# Patient Record
Sex: FEMALE | Race: BLACK OR AFRICAN AMERICAN | NOT HISPANIC OR LATINO | Employment: OTHER | URBAN - METROPOLITAN AREA
[De-identification: names, ages, dates, MRNs, and addresses within clinical notes are randomized per-mention and may not be internally consistent; named-entity substitution may affect disease eponyms.]

---

## 2023-06-07 DIAGNOSIS — B02.23 SHINGLES (HERPES ZOSTER) POLYNEUROPATHY: ICD-10-CM

## 2023-06-07 RX ORDER — HYDROCODONE BITARTRATE AND ACETAMINOPHEN 5; 325 MG/1; MG/1
TABLET ORAL
COMMUNITY
Start: 2023-03-13 | End: 2023-07-21 | Stop reason: ALTCHOICE

## 2023-06-07 RX ORDER — ALBUTEROL SULFATE 90 UG/1
AEROSOL, METERED RESPIRATORY (INHALATION)
COMMUNITY
Start: 2023-01-06

## 2023-06-07 RX ORDER — ALBUTEROL SULFATE 1.25 MG/3ML
SOLUTION RESPIRATORY (INHALATION) 4 TIMES DAILY
COMMUNITY
Start: 2023-01-13 | End: 2024-01-29 | Stop reason: WASHOUT

## 2023-06-07 RX ORDER — LEVOTHYROXINE SODIUM 75 UG/1
1 TABLET ORAL DAILY
COMMUNITY
Start: 2016-02-22 | End: 2023-07-06 | Stop reason: SDUPTHER

## 2023-06-07 RX ORDER — PREDNISONE 10 MG/1
TABLET ORAL
COMMUNITY
Start: 2023-01-06 | End: 2023-07-21 | Stop reason: ALTCHOICE

## 2023-06-07 RX ORDER — VALACYCLOVIR HYDROCHLORIDE 500 MG/1
1 TABLET, FILM COATED ORAL DAILY
COMMUNITY
Start: 2015-09-14 | End: 2023-06-07 | Stop reason: SDUPTHER

## 2023-06-07 RX ORDER — AMITRIPTYLINE HYDROCHLORIDE 10 MG/1
TABLET, FILM COATED ORAL
COMMUNITY
Start: 2023-03-01 | End: 2023-07-21 | Stop reason: ALTCHOICE

## 2023-06-07 RX ORDER — AZELASTINE 1 MG/ML
SPRAY, METERED NASAL
COMMUNITY
Start: 2021-06-17

## 2023-06-07 RX ORDER — FLUTICASONE PROPIONATE AND SALMETEROL 100; 50 UG/1; UG/1
1 POWDER RESPIRATORY (INHALATION) 2 TIMES DAILY
COMMUNITY
Start: 2023-01-13 | End: 2024-01-29 | Stop reason: WASHOUT

## 2023-06-07 RX ORDER — MOMETASONE FUROATE 50 UG/1
2 SPRAY, METERED NASAL DAILY
COMMUNITY
End: 2024-01-29 | Stop reason: WASHOUT

## 2023-06-07 RX ORDER — AZITHROMYCIN 250 MG/1
TABLET, FILM COATED ORAL
COMMUNITY
Start: 2023-01-13 | End: 2023-07-21 | Stop reason: ALTCHOICE

## 2023-06-07 RX ORDER — AMITRIPTYLINE HYDROCHLORIDE 25 MG/1
TABLET, FILM COATED ORAL
COMMUNITY
Start: 2023-05-17 | End: 2024-01-29 | Stop reason: WASHOUT

## 2023-06-07 RX ORDER — DOXYCYCLINE 100 MG/1
CAPSULE ORAL
COMMUNITY
Start: 2023-01-06 | End: 2023-07-21 | Stop reason: ALTCHOICE

## 2023-06-15 RX ORDER — VALACYCLOVIR HYDROCHLORIDE 500 MG/1
500 TABLET, FILM COATED ORAL DAILY
Qty: 90 TABLET | Refills: 3 | Status: SHIPPED | OUTPATIENT
Start: 2023-06-15 | End: 2023-07-21 | Stop reason: SDUPTHER

## 2023-06-22 PROBLEM — N95.2 ATROPHY OF VAGINA: Status: ACTIVE | Noted: 2023-06-22

## 2023-06-22 PROBLEM — J04.0 LARYNGITIS: Status: ACTIVE | Noted: 2023-06-22

## 2023-06-22 PROBLEM — M17.9 KNEE OSTEOARTHRITIS: Status: ACTIVE | Noted: 2023-06-22

## 2023-06-22 PROBLEM — M62.89 PELVIC FLOOR DYSFUNCTION: Status: ACTIVE | Noted: 2023-06-22

## 2023-06-22 PROBLEM — H52.4 ASTIGMATISM OF BOTH EYES WITH PRESBYOPIA: Status: ACTIVE | Noted: 2023-06-22

## 2023-06-22 PROBLEM — R53.1 WEAKNESS: Status: ACTIVE | Noted: 2023-06-22

## 2023-06-22 PROBLEM — M79.18 MYOFASCIAL PAIN SYNDROME: Status: ACTIVE | Noted: 2023-06-22

## 2023-06-22 PROBLEM — R79.89 LFT ELEVATION: Status: ACTIVE | Noted: 2023-06-22

## 2023-06-22 PROBLEM — I95.9 LOW BLOOD PRESSURE: Status: ACTIVE | Noted: 2023-06-22

## 2023-06-22 PROBLEM — M25.512 SHOULDER PAIN, BILATERAL: Status: ACTIVE | Noted: 2023-06-22

## 2023-06-22 PROBLEM — M99.05 SEGMENTAL AND SOMATIC DYSFUNCTION OF PELVIC REGION: Status: ACTIVE | Noted: 2023-06-22

## 2023-06-22 PROBLEM — R20.2 PARESTHESIA OF BOTH HANDS: Status: ACTIVE | Noted: 2023-06-22

## 2023-06-22 PROBLEM — G47.00 INSOMNIA: Status: ACTIVE | Noted: 2023-06-22

## 2023-06-22 PROBLEM — N95.1 HOT FLASHES, MENOPAUSAL: Status: ACTIVE | Noted: 2023-06-22

## 2023-06-22 PROBLEM — J30.89 ALLERGIC RHINITIS DUE TO DUST MITE: Status: ACTIVE | Noted: 2023-06-22

## 2023-06-22 PROBLEM — R73.9 HYPERGLYCEMIA: Status: ACTIVE | Noted: 2023-06-22

## 2023-06-22 PROBLEM — E83.51 HYPOCALCEMIA: Status: ACTIVE | Noted: 2023-06-22

## 2023-06-22 PROBLEM — K52.9 CHRONIC DIARRHEA: Status: ACTIVE | Noted: 2023-06-22

## 2023-06-22 PROBLEM — H16.229 KERATOCONJUNCTIVITIS SICCA: Status: ACTIVE | Noted: 2023-06-22

## 2023-06-22 PROBLEM — E53.8 VITAMIN B12 DEFICIENCY: Status: ACTIVE | Noted: 2023-06-22

## 2023-06-22 PROBLEM — M25.511 SHOULDER PAIN, BILATERAL: Status: ACTIVE | Noted: 2023-06-22

## 2023-06-22 PROBLEM — K58.9 IRRITABLE BOWEL SYNDROME: Status: ACTIVE | Noted: 2023-06-22

## 2023-06-22 PROBLEM — M25.551 ARTHRALGIA OF RIGHT HIP: Status: ACTIVE | Noted: 2023-06-22

## 2023-06-22 PROBLEM — R32 URINARY INCONTINENCE: Status: ACTIVE | Noted: 2023-06-22

## 2023-06-22 PROBLEM — M19.049 HAND ARTHRITIS: Status: ACTIVE | Noted: 2023-06-22

## 2023-06-22 PROBLEM — H52.203 ASTIGMATISM OF BOTH EYES WITH PRESBYOPIA: Status: ACTIVE | Noted: 2023-06-22

## 2023-06-22 PROBLEM — R51.9 HEADACHE: Status: ACTIVE | Noted: 2023-06-22

## 2023-06-22 PROBLEM — J06.9 URI WITH COUGH AND CONGESTION: Status: ACTIVE | Noted: 2023-06-22

## 2023-06-22 PROBLEM — H04.123 DRY EYE SYNDROME OF BOTH LACRIMAL GLANDS: Status: ACTIVE | Noted: 2023-06-22

## 2023-06-22 PROBLEM — J30.9 ALLERGIC RHINITIS: Status: ACTIVE | Noted: 2023-06-22

## 2023-06-22 PROBLEM — G47.33 OBSTRUCTIVE SLEEP APNEA: Status: ACTIVE | Noted: 2023-06-22

## 2023-06-22 PROBLEM — G47.21 DELAYED SLEEP PHASE SYNDROME: Status: ACTIVE | Noted: 2023-06-22

## 2023-06-22 PROBLEM — M75.80 ROTATOR CUFF TENDINITIS: Status: ACTIVE | Noted: 2023-06-22

## 2023-06-22 PROBLEM — J38.1 VOCAL CORD POLYP: Status: ACTIVE | Noted: 2023-06-22

## 2023-06-22 PROBLEM — R60.9 EDEMA: Status: ACTIVE | Noted: 2023-06-22

## 2023-06-22 PROBLEM — R21 RASH AND NONSPECIFIC SKIN ERUPTION: Status: ACTIVE | Noted: 2023-06-22

## 2023-06-22 PROBLEM — K21.9 ESOPHAGEAL REFLUX: Status: ACTIVE | Noted: 2023-06-22

## 2023-06-22 PROBLEM — D51.0 PERNICIOUS ANEMIA: Status: ACTIVE | Noted: 2023-06-22

## 2023-06-22 PROBLEM — R11.0 NAUSEA IN ADULT: Status: ACTIVE | Noted: 2023-06-22

## 2023-06-22 PROBLEM — F33.1 MODERATE EPISODE OF RECURRENT MAJOR DEPRESSIVE DISORDER (MULTI): Status: ACTIVE | Noted: 2023-06-22

## 2023-06-22 PROBLEM — M54.6 PAIN IN THORACIC SPINE: Status: ACTIVE | Noted: 2023-06-22

## 2023-06-22 PROBLEM — N62 MACROMASTIA: Status: ACTIVE | Noted: 2023-06-22

## 2023-06-22 PROBLEM — R10.2 PELVIC PAIN IN FEMALE: Status: ACTIVE | Noted: 2023-06-22

## 2023-06-22 PROBLEM — M53.3 SACROILIAC JOINT DYSFUNCTION OF BOTH SIDES: Status: ACTIVE | Noted: 2023-06-22

## 2023-06-22 PROBLEM — B30.9 ACUTE VIRAL CONJUNCTIVITIS OF BOTH EYES: Status: ACTIVE | Noted: 2023-06-22

## 2023-06-22 PROBLEM — J22 LOWER RESPIRATORY INFECTION: Status: ACTIVE | Noted: 2023-06-22

## 2023-06-22 PROBLEM — R49.0 HOARSENESS: Status: ACTIVE | Noted: 2023-06-22

## 2023-06-22 PROBLEM — Z98.84 H/O BARIATRIC SURGERY: Status: ACTIVE | Noted: 2023-06-22

## 2023-06-22 PROBLEM — M25.561 RIGHT KNEE PAIN: Status: ACTIVE | Noted: 2023-06-22

## 2023-06-22 PROBLEM — D64.9 ANEMIA: Status: ACTIVE | Noted: 2023-06-22

## 2023-06-22 PROBLEM — R53.83 FATIGUE: Status: ACTIVE | Noted: 2023-06-22

## 2023-06-22 PROBLEM — M54.9 BACK PAIN: Status: ACTIVE | Noted: 2023-06-22

## 2023-06-22 PROBLEM — E03.9 HYPOTHYROIDISM: Status: ACTIVE | Noted: 2023-06-22

## 2023-06-22 PROBLEM — J45.909 ASTHMATIC BRONCHITIS (HHS-HCC): Status: ACTIVE | Noted: 2023-06-22

## 2023-06-22 PROBLEM — R63.0 POOR APPETITE: Status: ACTIVE | Noted: 2023-06-22

## 2023-06-22 PROBLEM — B00.9 HERPES SIMPLEX VIRUS (HSV) INFECTION: Status: ACTIVE | Noted: 2023-06-22

## 2023-06-22 PROBLEM — J30.81 ALLERGIC RHINITIS DUE TO ANIMAL HAIR AND DANDER: Status: ACTIVE | Noted: 2023-06-22

## 2023-06-22 PROBLEM — E55.9 VITAMIN D DEFICIENCY: Status: ACTIVE | Noted: 2023-06-22

## 2023-06-22 PROBLEM — R19.7 DIARRHEA: Status: ACTIVE | Noted: 2023-06-22

## 2023-06-22 PROBLEM — M54.16 LUMBAR RADICULITIS: Status: ACTIVE | Noted: 2023-06-22

## 2023-06-22 PROBLEM — M25.859 FEMOROACETABULAR IMPINGEMENT: Status: ACTIVE | Noted: 2023-06-22

## 2023-06-22 PROBLEM — F41.9 ANXIETY: Status: ACTIVE | Noted: 2023-06-22

## 2023-06-22 PROBLEM — R06.02 SHORTNESS OF BREATH AT REST: Status: ACTIVE | Noted: 2023-06-22

## 2023-06-22 PROBLEM — M35.01 KERATOCONJUNCTIVITIS SICCA (MULTI): Status: ACTIVE | Noted: 2023-06-22

## 2023-06-22 PROBLEM — F43.10 PTSD (POST-TRAUMATIC STRESS DISORDER): Status: ACTIVE | Noted: 2023-06-22

## 2023-06-22 PROBLEM — D21.9 FIBROIDS: Status: ACTIVE | Noted: 2023-06-22

## 2023-06-22 PROBLEM — M75.20 BICEPS TENDONITIS: Status: ACTIVE | Noted: 2023-06-22

## 2023-06-22 PROBLEM — R05.3 CHRONIC COUGH: Status: ACTIVE | Noted: 2023-06-22

## 2023-07-06 DIAGNOSIS — E03.9 HYPOTHYROIDISM, UNSPECIFIED TYPE: Primary | ICD-10-CM

## 2023-07-10 RX ORDER — LEVOTHYROXINE SODIUM 75 UG/1
75 TABLET ORAL DAILY
Qty: 90 TABLET | Refills: 0 | Status: SHIPPED | OUTPATIENT
Start: 2023-07-10 | End: 2023-07-27

## 2023-07-13 ENCOUNTER — TELEPHONE (OUTPATIENT)
Dept: PRIMARY CARE | Facility: CLINIC | Age: 58
End: 2023-07-13
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
## 2023-07-18 NOTE — TELEPHONE ENCOUNTER
I called pt yesterday at around 6pm and again today at around 7:10am  No answer, I left message both times to call the office back and to follow up with psych

## 2023-07-21 ENCOUNTER — OFFICE VISIT (OUTPATIENT)
Dept: PRIMARY CARE | Facility: CLINIC | Age: 58
End: 2023-07-21
Payer: MEDICARE

## 2023-07-21 VITALS
SYSTOLIC BLOOD PRESSURE: 98 MMHG | BODY MASS INDEX: 36.82 KG/M2 | TEMPERATURE: 98 F | DIASTOLIC BLOOD PRESSURE: 68 MMHG | OXYGEN SATURATION: 98 % | RESPIRATION RATE: 15 BRPM | HEART RATE: 68 BPM | WEIGHT: 221 LBS | HEIGHT: 65 IN

## 2023-07-21 DIAGNOSIS — Z13.9 ENCOUNTER FOR SCREENING INVOLVING SOCIAL DETERMINANTS OF HEALTH (SDOH): ICD-10-CM

## 2023-07-21 DIAGNOSIS — K58.0 IRRITABLE BOWEL SYNDROME WITH DIARRHEA: ICD-10-CM

## 2023-07-21 DIAGNOSIS — R11.0 NAUSEA: ICD-10-CM

## 2023-07-21 DIAGNOSIS — F33.1 MODERATE EPISODE OF RECURRENT MAJOR DEPRESSIVE DISORDER (MULTI): ICD-10-CM

## 2023-07-21 DIAGNOSIS — B02.23 SHINGLES (HERPES ZOSTER) POLYNEUROPATHY: ICD-10-CM

## 2023-07-21 DIAGNOSIS — M35.01 KERATOCONJUNCTIVITIS SICCA (MULTI): ICD-10-CM

## 2023-07-21 DIAGNOSIS — J30.89 ALLERGIC RHINITIS DUE TO DUST MITE: Primary | ICD-10-CM

## 2023-07-21 DIAGNOSIS — R60.9 SWELLING: ICD-10-CM

## 2023-07-21 DIAGNOSIS — E66.01 SEVERE OBESITY (MULTI): ICD-10-CM

## 2023-07-21 PROBLEM — D50.8 IRON DEFICIENCY ANEMIA SECONDARY TO INADEQUATE DIETARY IRON INTAKE: Status: ACTIVE | Noted: 2017-02-27

## 2023-07-21 PROBLEM — M75.100 ROTATOR CUFF SYNDROME: Status: ACTIVE | Noted: 2018-01-23

## 2023-07-21 PROBLEM — G89.29 CHRONIC BILATERAL LOW BACK PAIN: Status: ACTIVE | Noted: 2017-06-21

## 2023-07-21 PROBLEM — F32.A DEPRESSIVE DISORDER: Status: ACTIVE | Noted: 2018-01-23

## 2023-07-21 PROBLEM — E63.9 NUTRITIONAL DEFICIENCY: Status: ACTIVE | Noted: 2017-02-27

## 2023-07-21 PROBLEM — E66.812 OBESITY, CLASS II, BMI 35-39.9: Status: ACTIVE | Noted: 2018-09-12

## 2023-07-21 PROBLEM — R44.0 AUDITORY HALLUCINATIONS: Status: ACTIVE | Noted: 2023-07-21

## 2023-07-21 PROBLEM — M54.50 CHRONIC BILATERAL LOW BACK PAIN: Status: ACTIVE | Noted: 2017-06-21

## 2023-07-21 PROBLEM — H52.4 BILATERAL PRESBYOPIA: Status: ACTIVE | Noted: 2023-07-21

## 2023-07-21 PROBLEM — E66.9 OBESITY, CLASS II, BMI 35-39.9: Status: ACTIVE | Noted: 2018-09-12

## 2023-07-21 PROBLEM — E87.6 HYPOKALEMIA: Status: ACTIVE | Noted: 2023-07-21

## 2023-07-21 PROBLEM — N62 HYPERTROPHY OF BREAST: Status: ACTIVE | Noted: 2018-06-01

## 2023-07-21 PROBLEM — R07.89 ATYPICAL CHEST PAIN: Status: ACTIVE | Noted: 2017-02-27

## 2023-07-21 PROCEDURE — 99215 OFFICE O/P EST HI 40 MIN: CPT | Performed by: FAMILY MEDICINE

## 2023-07-21 PROCEDURE — 1036F TOBACCO NON-USER: CPT | Performed by: FAMILY MEDICINE

## 2023-07-21 RX ORDER — DIPHENOXYLATE HYDROCHLORIDE AND ATROPINE SULFATE 2.5; .025 MG/1; MG/1
1 TABLET ORAL 2 TIMES DAILY PRN
Qty: 30 TABLET | Refills: 0 | Status: SHIPPED | OUTPATIENT
Start: 2023-07-21

## 2023-07-21 RX ORDER — FUROSEMIDE 20 MG/1
20 TABLET ORAL DAILY
Qty: 90 TABLET | Refills: 0 | Status: SHIPPED | OUTPATIENT
Start: 2023-07-21

## 2023-07-21 RX ORDER — SERTRALINE HYDROCHLORIDE 50 MG/1
50 TABLET, FILM COATED ORAL DAILY
Qty: 30 TABLET | Refills: 0 | Status: SHIPPED | OUTPATIENT
Start: 2023-07-21 | End: 2023-07-21 | Stop reason: SDUPTHER

## 2023-07-21 RX ORDER — VALACYCLOVIR HYDROCHLORIDE 500 MG/1
500 TABLET, FILM COATED ORAL DAILY
Qty: 90 TABLET | Refills: 3 | Status: SHIPPED | OUTPATIENT
Start: 2023-07-21

## 2023-07-21 RX ORDER — ONDANSETRON 4 MG/1
4 TABLET, FILM COATED ORAL EVERY 8 HOURS PRN
Qty: 30 TABLET | Refills: 1 | Status: SHIPPED | OUTPATIENT
Start: 2023-07-21

## 2023-07-21 RX ORDER — FLUTICASONE PROPIONATE 50 MCG
SPRAY, SUSPENSION (ML) NASAL
COMMUNITY
Start: 2021-04-19

## 2023-07-21 RX ORDER — ASPIRIN 325 MG
50000 TABLET, DELAYED RELEASE (ENTERIC COATED) ORAL WEEKLY
COMMUNITY
Start: 2017-02-27 | End: 2023-07-21 | Stop reason: ALTCHOICE

## 2023-07-21 RX ORDER — LEVOCETIRIZINE DIHYDROCHLORIDE 5 MG/1
5 TABLET, FILM COATED ORAL DAILY
Qty: 90 TABLET | Refills: 1 | Status: SHIPPED | OUTPATIENT
Start: 2023-07-21

## 2023-07-21 RX ORDER — SERTRALINE HYDROCHLORIDE 50 MG/1
50 TABLET, FILM COATED ORAL DAILY
Qty: 15 TABLET | Refills: 0 | Status: SHIPPED | OUTPATIENT
Start: 2023-07-21 | End: 2024-01-29 | Stop reason: WASHOUT

## 2023-07-21 ASSESSMENT — PATIENT HEALTH QUESTIONNAIRE - PHQ9
3. TROUBLE FALLING OR STAYING ASLEEP OR SLEEPING TOO MUCH: NEARLY EVERY DAY
7. TROUBLE CONCENTRATING ON THINGS, SUCH AS READING THE NEWSPAPER OR WATCHING TELEVISION: SEVERAL DAYS
2. FEELING DOWN, DEPRESSED OR HOPELESS: SEVERAL DAYS
1. LITTLE INTEREST OR PLEASURE IN DOING THINGS: NOT AT ALL
5. POOR APPETITE OR OVEREATING: NEARLY EVERY DAY
SUM OF ALL RESPONSES TO PHQ QUESTIONS 1-9: 8
4. FEELING TIRED OR HAVING LITTLE ENERGY: NOT AT ALL
8. MOVING OR SPEAKING SO SLOWLY THAT OTHER PEOPLE COULD HAVE NOTICED. OR THE OPPOSITE, BEING SO FIGETY OR RESTLESS THAT YOU HAVE BEEN MOVING AROUND A LOT MORE THAN USUAL: NOT AT ALL
SUM OF ALL RESPONSES TO PHQ9 QUESTIONS 1 AND 2: 1
6. FEELING BAD ABOUT YOURSELF - OR THAT YOU ARE A FAILURE OR HAVE LET YOURSELF OR YOUR FAMILY DOWN: NOT AT ALL
10. IF YOU CHECKED OFF ANY PROBLEMS, HOW DIFFICULT HAVE THESE PROBLEMS MADE IT FOR YOU TO DO YOUR WORK, TAKE CARE OF THINGS AT HOME, OR GET ALONG WITH OTHER PEOPLE: SOMEWHAT DIFFICULT
9. THOUGHTS THAT YOU WOULD BE BETTER OFF DEAD, OR OF HURTING YOURSELF: NOT AT ALL

## 2023-07-21 ASSESSMENT — LIFESTYLE VARIABLES: HOW MANY STANDARD DRINKS CONTAINING ALCOHOL DO YOU HAVE ON A TYPICAL DAY: PATIENT DOES NOT DRINK
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
## 2023-07-21 NOTE — PATIENT INSTRUCTIONS
Referral was placed to our community health workers regarding the needs you expressed in the office.   One of our community health workers  (Cielo Sheets) will be contacting you within the next 5 business days  *If  she does not call you, you can call them at- [phone]        For depression:  [facility]  [address], [place], [address]  [phone]  - To call your pharm to see if you can get meds from another pharm- states unable to get until Tuesday    Refilled meds    GI  - referral for GI      Please follow up in 3 months  or as needed.       ** If labs or imaging ordered at today's visit, all the non-urgent results will be discussed at your next visit    If you have been referred for a special test or to a specialist please call  [phone] to schedule an appointment.  If you have any further questions, or if develop new or worsened symptoms, please give our office a call at [phone].           Ways to Help Prevent Falls at Home    Quick Tips   ? Ask for help if you need it. Most people want to help!   ? Get up slowly after sitting or laying down   ? Wear a medical alert device or keep cell phone in your pocket   ? Use night lights, especially areas near a bathroom   ? Keep the items you use often within reach on a small stool or end table   ? Use an assistive device such as walker or cane, as directed by provider/physical therapy   ? Use a non-slip mat and grab bars in your bathroom. Look for home health sections for best options     Other Areas to Focus On   ? Exercise and nutrition: Regular exercise or taking a falls prevention class are great ways improve strength and balance. Don’t forget to stay hydrated and bring a snack!   ? Medicine side effects: Some medicines can make you sleepy or dizzy, which could cause a fall. Ask your healthcare provider about the side effects your medicines could cause. Be sure to let them know if you take any vitamins or supplements  as well.   ? Tripping hazards: Remove items you could trip on, such as loose mats, rugs, cords, and clutter. Wear closed toe shoes with rubber soles.   ? Health and wellness: Get regular checkups with your healthcare provider, plus routine vision and hearing screenings. Talk with your healthcare provider about:   o Your medicines and the possible side effects - bring them in a bag if that is easier!   o Problems with balance or feeling dizzy   o Ways to promote bone health, such as Vitamin D and calcium supplements   o Questions or concerns about falling     *Ask your healthcare team if you have questions     ©[facility], 2022

## 2023-07-24 ASSESSMENT — ANXIETY QUESTIONNAIRES
6. BECOMING EASILY ANNOYED OR IRRITABLE: MORE THAN HALF THE DAYS
7. FEELING AFRAID AS IF SOMETHING AWFUL MIGHT HAPPEN: NOT AT ALL
5. BEING SO RESTLESS THAT IT IS HARD TO SIT STILL: NOT AT ALL
4. TROUBLE RELAXING: NOT AT ALL
3. WORRYING TOO MUCH ABOUT DIFFERENT THINGS: SEVERAL DAYS
IF YOU CHECKED OFF ANY PROBLEMS ON THIS QUESTIONNAIRE, HOW DIFFICULT HAVE THESE PROBLEMS MADE IT FOR YOU TO DO YOUR WORK, TAKE CARE OF THINGS AT HOME, OR GET ALONG WITH OTHER PEOPLE: SOMEWHAT DIFFICULT
GAD7 TOTAL SCORE: 6
1. FEELING NERVOUS, ANXIOUS, OR ON EDGE: MORE THAN HALF THE DAYS
2. NOT BEING ABLE TO STOP OR CONTROL WORRYING: SEVERAL DAYS

## 2023-07-26 NOTE — PROGRESS NOTES
CHW phoned pt to discuss her SDOH referral for housing and medical insurance. Per pt she filled out applicdations for Section 8 in [facility]. CHW gave pt information to sign up for Section 8 in [facility]. Pt also filled out an application for Medicare. Pt will phone CHW if she need further assistance.

## 2023-08-25 ENCOUNTER — HOSPITAL ENCOUNTER (OUTPATIENT)
Dept: DATA CONVERSION | Facility: HOSPITAL | Age: 58
End: 2023-08-25
Attending: ORTHOPAEDIC SURGERY | Admitting: ORTHOPAEDIC SURGERY
Payer: MEDICARE

## 2023-08-25 DIAGNOSIS — M75.122 COMPLETE ROTATOR CUFF TEAR OR RUPTURE OF LEFT SHOULDER, NOT SPECIFIED AS TRAUMATIC: ICD-10-CM

## 2023-09-27 PROBLEM — M19.012 ARTHRITIS OF LEFT SHOULDER REGION: Status: ACTIVE | Noted: 2023-09-27

## 2023-09-27 PROBLEM — M25.552 CHRONIC LEFT HIP PAIN: Status: ACTIVE | Noted: 2023-09-27

## 2023-09-27 PROBLEM — N95.1 MENOPAUSAL SYMPTOM: Status: ACTIVE | Noted: 2023-09-27

## 2023-09-27 PROBLEM — J45.909 ASTHMA (HHS-HCC): Status: ACTIVE | Noted: 2023-09-27

## 2023-09-27 PROBLEM — E66.01 CLASS 2 SEVERE OBESITY DUE TO EXCESS CALORIES WITH SERIOUS COMORBIDITY AND BODY MASS INDEX (BMI) OF 37.0 TO 37.9 IN ADULT (MULTI): Status: ACTIVE | Noted: 2023-09-27

## 2023-09-27 PROBLEM — G89.29 CHRONIC LEFT HIP PAIN: Status: ACTIVE | Noted: 2023-09-27

## 2023-09-27 PROBLEM — R07.9 CHEST PAIN: Status: ACTIVE | Noted: 2023-09-27

## 2023-09-27 PROBLEM — M54.2 CHRONIC NECK PAIN: Status: ACTIVE | Noted: 2023-09-27

## 2023-09-27 PROBLEM — M79.7 FIBROMYALGIA: Status: ACTIVE | Noted: 2023-09-27

## 2023-09-27 PROBLEM — G89.29 CHRONIC NECK PAIN: Status: ACTIVE | Noted: 2023-09-27

## 2023-09-27 PROBLEM — E66.812 CLASS 2 SEVERE OBESITY DUE TO EXCESS CALORIES WITH SERIOUS COMORBIDITY AND BODY MASS INDEX (BMI) OF 37.0 TO 37.9 IN ADULT: Status: ACTIVE | Noted: 2023-09-27

## 2023-09-27 PROBLEM — M75.102 ROTATOR CUFF TEAR, LEFT: Status: ACTIVE | Noted: 2023-09-27

## 2023-09-27 PROBLEM — R32 INCONTINENCE OF URINE: Status: ACTIVE | Noted: 2023-09-27

## 2023-09-27 PROBLEM — H52.223 REGULAR ASTIGMATISM OF BOTH EYES: Status: ACTIVE | Noted: 2023-09-27

## 2023-09-27 PROBLEM — L73.8 OTHER SPECIFIED FOLLICULAR DISORDERS: Status: ACTIVE | Noted: 2021-08-05

## 2023-09-27 RX ORDER — MAGNESIUM 200 MG
1000 TABLET ORAL
COMMUNITY
Start: 2023-08-01 | End: 2023-10-30

## 2023-09-27 RX ORDER — FERROUS GLUCONATE 324(38)MG
1 TABLET ORAL
COMMUNITY
Start: 2023-08-11

## 2023-09-27 RX ORDER — ERGOCALCIFEROL 1.25 MG/1
1 CAPSULE ORAL WEEKLY
COMMUNITY
Start: 2023-08-01 | End: 2023-10-18

## 2023-09-27 RX ORDER — TRAZODONE HYDROCHLORIDE 50 MG/1
50 TABLET ORAL NIGHTLY PRN
COMMUNITY
Start: 2023-08-01 | End: 2024-01-29 | Stop reason: WASHOUT

## 2023-09-27 RX ORDER — DOXYCYCLINE HYCLATE 100 MG
100 TABLET ORAL
COMMUNITY
Start: 2021-08-05

## 2023-09-27 RX ORDER — PEN NEEDLE, DIABETIC 31 GX5/16"
NEEDLE, DISPOSABLE MISCELLANEOUS
COMMUNITY
End: 2024-01-29 | Stop reason: WASHOUT

## 2023-09-27 RX ORDER — AMITRIPTYLINE HYDROCHLORIDE 10 MG/1
10 TABLET, FILM COATED ORAL NIGHTLY
COMMUNITY
End: 2024-01-29 | Stop reason: WASHOUT

## 2023-09-27 RX ORDER — RISPERIDONE 0.5 MG/1
0.5 TABLET ORAL NIGHTLY
COMMUNITY
End: 2024-01-29 | Stop reason: WASHOUT

## 2023-09-27 RX ORDER — GABAPENTIN 300 MG/1
300 CAPSULE ORAL 2 TIMES DAILY
COMMUNITY
Start: 2023-08-01

## 2023-09-27 RX ORDER — DULOXETIN HYDROCHLORIDE 30 MG/1
30 CAPSULE, DELAYED RELEASE ORAL DAILY
COMMUNITY

## 2023-09-27 RX ORDER — FLUOCINONIDE 0.5 MG/G
1 CREAM TOPICAL
COMMUNITY
Start: 2017-10-03

## 2023-09-27 RX ORDER — KETOCONAZOLE 20 MG/ML
1 SHAMPOO, SUSPENSION TOPICAL
COMMUNITY
Start: 2021-08-05 | End: 2024-01-29 | Stop reason: WASHOUT

## 2023-09-27 RX ORDER — CHLORHEXIDINE GLUCONATE ORAL RINSE 1.2 MG/ML
SOLUTION DENTAL
COMMUNITY
Start: 2023-08-23 | End: 2024-01-29 | Stop reason: WASHOUT

## 2023-09-29 VITALS — HEIGHT: 65 IN | BODY MASS INDEX: 37.1 KG/M2 | WEIGHT: 222.66 LBS

## 2023-09-30 NOTE — H&P
History & Physical Reviewed:   Pregnant/Lactating:  ·  Are You Pregnant no   ·  Are You Currently Breastfeeding no     I have reviewed the History and Physical dated:  14-Aug-2023   History and Physical reviewed and relevant findings noted. Patient examined to review pertinent physical  findings.: No significant changes   Home Medications Reviewed: no changes noted   Allergies Reviewed: no changes noted       ERAS (Enhanced Recovery After Surgery):  ·  ERAS Patient: no     Consent:   COVID-19 Consent:  ·  COVID-19 Risk Consent Surgeon has reviewed key risks related to the risk of renaldo COVID-19 and if they contract COVID-19 what the risks are.     Attestation:   Note Completion:  I am a:  Resident/Fellow   Attending Attestation I saw and evaluated the patient.  I personally obtained the key and critical portions of the history and physical exam or was physically present for key and  critical portions performed by the resident/fellow. I reviewed the resident/fellow?s documentation and discussed the patient with the resident/fellow.  I agree with the resident/fellow?s medical decision making as documented in the note.     I personally evaluated the patient on 25-Aug-2023         Electronic Signatures:  Octavio Tompkins)  (Signed 25-Aug-2023 15:34)   Authored: Note Completion   Co-Signer: History & Physical Reviewed, ERAS, Consent, Note Completion  Raleigh Gonzalez (Resident))  (Signed 25-Aug-2023 11:12)   Authored: History & Physical Reviewed, ERAS, Consent,  Note Completion      Last Updated: 25-Aug-2023 15:34 by Octavio Tompkins)

## 2023-10-01 NOTE — OP NOTE
PROCEDURE DETAILS    Preoperative Diagnosis:  Complete rotator cuff tear or rupture of left shoulder, not specified as traumatic, M75.122    Postoperative Diagnosis:  Complete rotator cuff tear or rupture of left shoulder, not specified as traumatic, M75.122    Surgeon: Octavio Tompkins  Resident/Fellow/Other Assistant: Chema Wilson    Procedure:  1. Left Shoulder Arthroscopic Decompression; Debridement; Rotator Cuff Repair and Biceps Tenodesis    Anesthesia: RolaAristeoBrittany  Estimated Blood Loss: 10  Findings: Diagnosis massive superior rotator cuff tear that was not repairable anterior posterior rotator cuff were intact        Operative Report:   Brief history is a very pleasant female bilateral shoulder pain with large to massive rotator cuff tears risk benefits alternatives of surgery were discussed including risk infection  bleeding nerve injury no improvement pain stiffness and not healing of the rotator cuff she understood and wished to proceed consent was signed shoulder was marked interscalene nerve block was performed she was taken the operating room stat [facility].   We performed a huddle.  All were in agreement the left shoulder.  Following this we then intubated sedated the patient prepped and draped her in standard fashion.  Positioned in 90 degree beachchair position all bony prominences well-padded.  Timeout  was performed.  Antibiotics were dosed.  We made a standard posterior portal into the glenohumeral joint.  Her exam under anesthesia position forward elevation was easily manipulated.  This got her back to her full passive range of motion prior to the  arthroscopic surgery.  Her biceps was in reasonable position but when it was brought into the joint showed a significant inflammation from the groove and slight tearing.  Posterior and anterior cuff are intact no signs of significant arthritis.  With  subacromial space did extensive decompression.  We had done a debridement of  the superior labrum anterior labrum articular cartilage and the undersurface of the rotator cuff.  This was done with a shaver and arthroscopic cautery.  In the subacromial space  we had to free up more scar tissue this is significant.  We released the biceps tendon.  We freed up the rotator cuff all the way to the scapular spine we are unable to get it fully back where the footprint.  We placed 1 swivel lock anchor into the partial  repair bringing up the posterior rotator cuff onto the greater tuberosity.  The greater tuberosity was smoothed out with the shaver.  We then brought the biceps tendon also to this anchor laying it over top of the greater tuberosity.  This helped smooth  out the area.  Copiously irrigated the wound and closed the shoulder in standard fashion.                        Attestation:   Note Completion:  Attending Attestation I was present for key portions of the procedure and the procedure lasted longer than 5 minutes.         Electronic Signatures:  Octavio Tompkins)  (Signed 25-Aug-2023 15:42)   Authored: Post-Operative Note, Chart Review, Note Completion      Last Updated: 25-Aug-2023 15:42 by Octavio Tompkins)

## 2023-10-02 ENCOUNTER — APPOINTMENT (OUTPATIENT)
Dept: PHYSICAL THERAPY | Facility: CLINIC | Age: 58
End: 2023-10-02
Payer: MEDICARE

## 2023-10-05 ENCOUNTER — APPOINTMENT (OUTPATIENT)
Dept: PHYSICAL THERAPY | Facility: CLINIC | Age: 58
End: 2023-10-05
Payer: MEDICARE

## 2023-10-09 ENCOUNTER — TREATMENT (OUTPATIENT)
Dept: PHYSICAL THERAPY | Facility: CLINIC | Age: 58
End: 2023-10-09
Payer: MEDICARE

## 2023-10-09 DIAGNOSIS — G89.29 CHRONIC PAIN OF BOTH SHOULDERS: Primary | ICD-10-CM

## 2023-10-09 DIAGNOSIS — M75.102 ROTATOR CUFF TEAR, LEFT: ICD-10-CM

## 2023-10-09 DIAGNOSIS — M25.512 CHRONIC PAIN OF BOTH SHOULDERS: Primary | ICD-10-CM

## 2023-10-09 DIAGNOSIS — M25.511 CHRONIC PAIN OF BOTH SHOULDERS: Primary | ICD-10-CM

## 2023-10-09 PROCEDURE — 97110 THERAPEUTIC EXERCISES: CPT | Mod: GP,CQ | Performed by: SPECIALIST/TECHNOLOGIST

## 2023-10-09 PROCEDURE — 97016 VASOPNEUMATIC DEVICE THERAPY: CPT | Mod: GP,CQ | Performed by: SPECIALIST/TECHNOLOGIST

## 2023-10-09 PROCEDURE — 97140 MANUAL THERAPY 1/> REGIONS: CPT | Mod: GP,CQ | Performed by: SPECIALIST/TECHNOLOGIST

## 2023-10-09 ASSESSMENT — PAIN - FUNCTIONAL ASSESSMENT: PAIN_FUNCTIONAL_ASSESSMENT: 0-10

## 2023-10-09 ASSESSMENT — PAIN SCALES - GENERAL: PAINLEVEL_OUTOF10: 6

## 2023-10-09 NOTE — PROGRESS NOTES
Physical Therapy  Physical Therapy Treatment    Patient Name: Stephanie Castillo  MRN: [id number]  Today's Date: 10/9/2023  Time Calculation  Start Time: 1435  Stop Time: 1530  Time Calculation (min): 55 min    Insurance:  Visit number: 4 of med nec   Authorization info: no auth needed  Insurance Type: Medicare/medicaid  Cert date start: 9/19/2023        Cert date end: 12/9/2023                General:  Reason for visit: L RTC repair on 8/25/2023  Referred by: GARO Tompkins MD      Assessment: Patient tolerated intensity noting end range discomfort and tenderness at portals.  Portal tenderness eliminated by towel massage.        Plan: Continue to improve ROM, anterior flexibility and scapular stability to improve posture and ADL, progressing out of sling per MD orders.  Patient has MD follow-up tomorrow.        Current Problem  1. Chronic pain of both shoulders        2. Rotator cuff tear, left              Precautions  Post-Surgical Precautions: Left shoulder precautions      Pain Assessment: 0-10  Pain Score: 6    Subjective:   Patient arrived wearing a sling noting pain 6 of 10 on arrival.  Patient notes she has recovered from illness.     HEP Performed:  Yes    Objective:   Shoulder PROM Flexion 110° Abd 80°    TTP biceps, delt, posterior shoulder, ticklish on rhomboids (portals 4 of 10)     Treatments:     Codmans AP/ML 1'   Pulleys Flex/Scap 3'   Wand ext/rot 20x   Supine wand AAROM flex 20x  PROM L shoulder all planes 10 min   Cross fiber pecs   SS pecs   L shoulder mobs all planes 10 min   Towel massage portals 2x 30s (eliminated portal pain)  Game ready 10 min L shoulder     Charges: TE x2, Man, Vaso (cq)     Kofi Pleitez, PTA

## 2023-10-10 ENCOUNTER — OFFICE VISIT (OUTPATIENT)
Dept: ORTHOPEDIC SURGERY | Facility: HOSPITAL | Age: 58
End: 2023-10-10
Payer: MEDICARE

## 2023-10-10 DIAGNOSIS — M25.512 ACUTE PAIN OF LEFT SHOULDER: Primary | ICD-10-CM

## 2023-10-10 PROCEDURE — 1036F TOBACCO NON-USER: CPT | Performed by: ORTHOPAEDIC SURGERY

## 2023-10-10 PROCEDURE — 99024 POSTOP FOLLOW-UP VISIT: CPT | Performed by: ORTHOPAEDIC SURGERY

## 2023-10-10 NOTE — PROGRESS NOTES
Subjective    Patient ID: Stephanie Castillo is a 58 y.o. female.    Chief Complaint: No chief complaint on file.     Last Surgery: Left arthroscopic rotator cuff repair with arthroscopic biceps tenodesis.   Last Surgery Date: 08/25/23    STEPHANIE CASTILLO is a pleasant 58 year old female who returns to clinic for her first postoperative visit. She is status post left arthroscopic rotator cuff repair and arthroscopic biceps tenodesis on 8/25/23.     She reports doing well after surgery. Pain is well managed during the day, worse at night. She continues to use narcotic pain medication as needed. Alternating with Over-the-counter pain medication. Using ice daily. She is not sleeping without difficulty. She denies redness or warmth at incision site. Denies any fever, chills, or paresthesia. There is some intermittent pain that waxes and wanes between moderate and mild severity. She has been very compliant with restrictions. Presents today wearing her sling. Doing well with daily home therapy for elbow, wrist and hand. She is requesting additional pain medication today.     10/10/23  Stephanie returns to the clinic for her second post operative visit. She is status post left arthroscopic rotator cuff repair and arthroscopic biceps tenodesis on 8/25/23.    She is doing well but is quite stiff today. Her right arm has started to hurt her as well due to overcompensation.        Objective   Patient is a well-developed, well-nourished female in no acute distress.  Breathes with normal chest rises.  Pupils are round and symmetric today.  Awake, alert, and oriented x3.      Examination of the right shoulder today reveals the skin to be intact. There is no sign of any atrophy, lesions, or abrasions. There is no pain to palpation of the bony prominences. Cervical lymphadenopathy examined, and this was negative. Patient had 5 out of 5 wrist flexion, extension, and thumb extension, bilaterally. Sensation was intact to light  touch to median, ulnar, radial, axillary, and musculocutaneous nerves bilaterally. Positive radial pulse bilaterally. Provocative maneuvers on the right side today were negative.  Range of motion of the right shoulder revealed 0-170° of forward elevation, 0-60° of external rotation, and internal rotation up to T-12.     Patient portal shoulder incisions are dry, intact and healing well. Minimal swelling. No warmth or erythema. No ecchymosis. Sutures were removed today and steris strips placed on incision sites on top of shoulder. Neurovascularly intact distally. 5 out of 5 wrist, hand and thumb flexion and extension without pain. Full active range of motion of elbow. 90 degrees passively of forward elevation. 0 degrees external rotation. 2/5 Luan's testing    Image Results:        Assessment/Plan   Encounter Diagnoses:  No diagnosis found.  FELA is 6+ weeks status post left arthroscopic rotator cuff repair, decompression, debridement and arthroscopic biceps tenodesis on 8/25/23.     She is quite stiff after surgery. I want her to focus on range of motion and really stretching out her shoulder. She should discard her sling at this time, and do not even sleep with it. She should not be lifting more than a coffee cup. She will continue to do physical therapy. We will see her back in 6 weeks for a repeat clinical check    No orders of the defined types were placed in this encounter.    Followup in 6 weeks    Scribe Attestation  By signing my name below, Linda MOON Scribe   attest that this documentation has been prepared under the direction and in the presence of Octavio Tompkins MD.       no

## 2023-10-12 ENCOUNTER — APPOINTMENT (OUTPATIENT)
Dept: PHYSICAL THERAPY | Facility: CLINIC | Age: 58
End: 2023-10-12
Payer: MEDICARE

## 2023-10-16 ENCOUNTER — APPOINTMENT (OUTPATIENT)
Dept: PHYSICAL THERAPY | Facility: CLINIC | Age: 58
End: 2023-10-16
Payer: MEDICARE

## 2023-10-19 ENCOUNTER — APPOINTMENT (OUTPATIENT)
Dept: PHYSICAL THERAPY | Facility: CLINIC | Age: 58
End: 2023-10-19
Payer: MEDICARE

## 2023-10-23 ENCOUNTER — TREATMENT (OUTPATIENT)
Dept: PHYSICAL THERAPY | Facility: CLINIC | Age: 58
End: 2023-10-23
Payer: MEDICARE

## 2023-10-23 DIAGNOSIS — M25.511 CHRONIC PAIN OF BOTH SHOULDERS: Primary | ICD-10-CM

## 2023-10-23 DIAGNOSIS — M75.102 ROTATOR CUFF TEAR, LEFT: ICD-10-CM

## 2023-10-23 DIAGNOSIS — G89.29 CHRONIC PAIN OF BOTH SHOULDERS: Primary | ICD-10-CM

## 2023-10-23 DIAGNOSIS — M25.512 CHRONIC PAIN OF BOTH SHOULDERS: Primary | ICD-10-CM

## 2023-10-23 PROCEDURE — 97110 THERAPEUTIC EXERCISES: CPT | Mod: GP,CQ | Performed by: SPECIALIST/TECHNOLOGIST

## 2023-10-23 PROCEDURE — 97016 VASOPNEUMATIC DEVICE THERAPY: CPT | Mod: GP,CQ | Performed by: SPECIALIST/TECHNOLOGIST

## 2023-10-23 PROCEDURE — 97140 MANUAL THERAPY 1/> REGIONS: CPT | Mod: GP,CQ | Performed by: SPECIALIST/TECHNOLOGIST

## 2023-10-23 ASSESSMENT — PAIN SCALES - GENERAL: PAINLEVEL_OUTOF10: 0 - NO PAIN

## 2023-10-23 ASSESSMENT — PAIN - FUNCTIONAL ASSESSMENT: PAIN_FUNCTIONAL_ASSESSMENT: 0-10

## 2023-10-23 NOTE — PROGRESS NOTES
Physical Therapy  Physical Therapy Treatment    Patient Name: Stephanie Castillo  MRN: [id number]  Today's Date: 10/23/2023  Time Calculation  Start Time: 1350  Stop Time: 1445  Time Calculation (min): 55 min    Insurance:  Visit number: 5 of [facility]   Authorization info: no auth needed  Insurance Type: Medicare/medicaid  Cert date start: 9/19/2023        Cert date end: 12/9/2023                General:  Reason for visit: L RTC repair on 8/25/2023  Referred by: GARO Tompkins MD      Assessment: Patient tolerated intensity noting end range soreness which decreased with repetition.      Plan: Continue to improve ROM, anterior flexibility and scapular stability to improve posture and ADL, Patient will reschedule missed appointments due to illness.       Current Problem  1. Chronic pain of both shoulders        2. Rotator cuff tear, left            Precautions  Post-Surgical Precautions: Left shoulder precautions  Precautions Comment: MD note 10/10 no lifting more than coffee cup      Pain Assessment: 0-10  Pain Score: 0 - No pain    Subjective:   Patient arrived without a sling noting no pain on arrival.  Patient notes she has recovered from illness.  Patient notes MD appointment after last session stated to emphasize ROM still lifting no more than a coffee cup.  Patient notes having good trip to [place] but got ill after returning home.  (Covid negative).     HEP Performed:  Yes    Objective:   Shoulder PROM Flexion 125° Abd 90°; AROM Flex 85° (with wall slide 105°)     TTP biceps, delt, posterior shoulder, ticklish on rhomboids (portals 4 of 10)     Treatments:     Sci-Fit L2 3' F/R   Pulleys Flex/Scap 3'   Flexion ball walk 10x (last 30 sec flexion hold)   Wand ext/rot 20x   Iso flex/abd/ext 10x5s  Seated scapular retraction 10x   Supine wand AAROM flex 10x  (to 120°)  PROM L shoulder all planes 10 min   Cross fiber pecs   SS pecs   L shoulder mobs all planes 10 min   Game ready 10 min L shoulder     Charges: TE x2,  Nabil, Vaso (cq)     Kofi Pleitez, PTA

## 2023-10-26 ENCOUNTER — TREATMENT (OUTPATIENT)
Dept: PHYSICAL THERAPY | Facility: CLINIC | Age: 58
End: 2023-10-26
Payer: MEDICARE

## 2023-10-26 DIAGNOSIS — M25.511 CHRONIC PAIN OF BOTH SHOULDERS: Primary | ICD-10-CM

## 2023-10-26 DIAGNOSIS — G89.29 CHRONIC PAIN OF BOTH SHOULDERS: Primary | ICD-10-CM

## 2023-10-26 DIAGNOSIS — M75.102 ROTATOR CUFF TEAR, LEFT: ICD-10-CM

## 2023-10-26 DIAGNOSIS — M25.512 CHRONIC PAIN OF BOTH SHOULDERS: Primary | ICD-10-CM

## 2023-10-26 PROCEDURE — 97140 MANUAL THERAPY 1/> REGIONS: CPT | Mod: GP,CQ | Performed by: SPECIALIST/TECHNOLOGIST

## 2023-10-26 PROCEDURE — 97016 VASOPNEUMATIC DEVICE THERAPY: CPT | Mod: GP,CQ | Performed by: SPECIALIST/TECHNOLOGIST

## 2023-10-26 PROCEDURE — 97110 THERAPEUTIC EXERCISES: CPT | Mod: GP,CQ | Performed by: SPECIALIST/TECHNOLOGIST

## 2023-10-26 ASSESSMENT — PAIN SCALES - GENERAL: PAINLEVEL_OUTOF10: 3

## 2023-10-26 ASSESSMENT — PAIN - FUNCTIONAL ASSESSMENT: PAIN_FUNCTIONAL_ASSESSMENT: 0-10

## 2023-10-26 NOTE — PROGRESS NOTES
Physical Therapy  Physical Therapy Treatment    Patient Name: Stephanie Castillo  MRN: [id number]  Today's Date: 10/26/2023  Time Calculation  Start Time: 1345  Stop Time: 1440  Time Calculation (min): 55 min    Insurance:  Visit number: 6 of med nec   Authorization info: no auth needed  Insurance Type: Medicare/medicaid  Cert date start: 9/19/2023        Cert date end: 12/9/2023                General:  Reason for visit: L RTC repair on 8/25/2023  Referred by: GARO Tompkins MD    Current Problem  1. Chronic pain of both shoulders        2. Rotator cuff tear, left          Precautions  Post-Surgical Precautions: Left shoulder precautions    Pain Assessment: 0-10  Pain Score: 3    Subjective:   Patient notes some R sided shoulder pain.  Patient notes minimal L shoulder pain (3 of 10) on arrival.  Patient notes more soreness on R than L after last session.  Patient notes L shoulder starting to move better.    HEP Performed:  Yes    Objective:   Shoulder PROM Flexion 125° Abd 90°; AROM Flex 85° (with wall slide 105°)     TTP biceps, delt, posterior shoulder, ticklish on rhomboids (portals 4 of 10)     Treatments:     Sci-Fit L2 3' F/R   Pulleys Flex/Scap 3'   Flexion ball walk 10x (last 30 sec flexion hold)   Wand ext/rot 20x   Iso flex/abd/ext 10x5s  Seated scapular retraction 10x   Supine wand AAROM flex 10x  (to 120°)  PROM L shoulder all planes 10 min   Cross fiber pecs   SS pecs   L shoulder mobs all planes 10 min   Game ready 10 min L shoulder     Charges: TE x2, Man, Vaso (cq)     Assessment: Patient tolerated intensity still having limited AROM on arrival with improving AROM by end of session and noting end range soreness which decreased with repetition.      Plan: Continue to improve ROM, anterior flexibility and scapular stability to improve posture and ADL,         Kofi Pleitez, PTA

## 2023-10-30 ENCOUNTER — OFFICE VISIT (OUTPATIENT)
Dept: PHYSICAL MEDICINE AND REHAB | Facility: CLINIC | Age: 58
End: 2023-10-30
Payer: MEDICARE

## 2023-10-30 ENCOUNTER — TREATMENT (OUTPATIENT)
Dept: PHYSICAL THERAPY | Facility: CLINIC | Age: 58
End: 2023-10-30
Payer: MEDICARE

## 2023-10-30 VITALS
SYSTOLIC BLOOD PRESSURE: 107 MMHG | WEIGHT: 225 LBS | HEIGHT: 65 IN | BODY MASS INDEX: 37.49 KG/M2 | DIASTOLIC BLOOD PRESSURE: 66 MMHG | OXYGEN SATURATION: 99 % | HEART RATE: 85 BPM | TEMPERATURE: 96.6 F

## 2023-10-30 DIAGNOSIS — M79.7 FIBROMYALGIA: ICD-10-CM

## 2023-10-30 DIAGNOSIS — M25.511 CHRONIC PAIN OF BOTH SHOULDERS: ICD-10-CM

## 2023-10-30 DIAGNOSIS — M79.18 MYOFASCIAL PAIN SYNDROME: ICD-10-CM

## 2023-10-30 DIAGNOSIS — M54.2 CHRONIC NECK PAIN: ICD-10-CM

## 2023-10-30 DIAGNOSIS — M25.512 CHRONIC PAIN OF BOTH SHOULDERS: Primary | ICD-10-CM

## 2023-10-30 DIAGNOSIS — M53.3 SACROILIAC JOINT DYSFUNCTION OF BOTH SIDES: ICD-10-CM

## 2023-10-30 DIAGNOSIS — G89.29 CHRONIC BILATERAL LOW BACK PAIN WITHOUT SCIATICA: ICD-10-CM

## 2023-10-30 DIAGNOSIS — G89.29 CHRONIC NECK PAIN: ICD-10-CM

## 2023-10-30 DIAGNOSIS — M54.50 CHRONIC BILATERAL LOW BACK PAIN WITHOUT SCIATICA: ICD-10-CM

## 2023-10-30 DIAGNOSIS — M25.512 CHRONIC PAIN OF BOTH SHOULDERS: ICD-10-CM

## 2023-10-30 DIAGNOSIS — M25.561 CHRONIC PAIN OF RIGHT KNEE: ICD-10-CM

## 2023-10-30 DIAGNOSIS — M75.102 ROTATOR CUFF TEAR, LEFT: Primary | ICD-10-CM

## 2023-10-30 DIAGNOSIS — M25.859 FEMOROACETABULAR IMPINGEMENT: ICD-10-CM

## 2023-10-30 DIAGNOSIS — M54.6 PAIN IN THORACIC SPINE: ICD-10-CM

## 2023-10-30 DIAGNOSIS — M25.511 CHRONIC PAIN OF BOTH SHOULDERS: Primary | ICD-10-CM

## 2023-10-30 DIAGNOSIS — G89.29 CHRONIC PAIN OF RIGHT KNEE: ICD-10-CM

## 2023-10-30 DIAGNOSIS — G89.29 CHRONIC PAIN OF BOTH SHOULDERS: ICD-10-CM

## 2023-10-30 DIAGNOSIS — G89.29 CHRONIC PAIN OF BOTH SHOULDERS: Primary | ICD-10-CM

## 2023-10-30 DIAGNOSIS — M54.16 LUMBAR RADICULITIS: ICD-10-CM

## 2023-10-30 PROCEDURE — 20553 NJX 1/MLT TRIGGER POINTS 3/>: CPT | Performed by: PHYSICAL MEDICINE & REHABILITATION

## 2023-10-30 PROCEDURE — 1036F TOBACCO NON-USER: CPT | Performed by: PHYSICAL MEDICINE & REHABILITATION

## 2023-10-30 PROCEDURE — 99214 OFFICE O/P EST MOD 30 MIN: CPT | Performed by: PHYSICAL MEDICINE & REHABILITATION

## 2023-10-30 PROCEDURE — 97110 THERAPEUTIC EXERCISES: CPT | Mod: GP

## 2023-10-30 ASSESSMENT — PAIN SCALES - GENERAL
PAINLEVEL_OUTOF10: 5 - MODERATE PAIN
PAINLEVEL: 9

## 2023-10-30 NOTE — PROGRESS NOTES
Physical Therapy Treatment    Patient Name: Stephanie Castillo  MRN: [id number]  Today's Date: 10/30/2023  Time Calculation  Start Time: 1407  Stop Time: 1450  Time Calculation (min): 43 min    Insurance:     Visit number: 6 of med nec   Authorization info: no auth needed  Insurance Type: Medicare/medicaid  Cert date start: 9/19/2023        Cert date end: 12/9/2023     Current Problem  1. Rotator cuff tear, left        2. Chronic pain of both shoulders            General  Reason for Referral: RTC repair  Referred By: Turner Long  Precautions  Post-Surgical Precautions: Left shoulder precautions  Pain  Pain Score: 5 - Moderate pain    Subjective:   Subjective   Patient reports L shoulder has been painful, but more concerning R shoulder has been more painful then the left since she left last visit.  Has been having a hard time even lifting arm up - has known tear in RTC R,     Compliant with HEP? YEs    Objective:   Objective     Shoulder ROM  Flexion 47 - active   Abd  ER 49  IR 61  Behind back  Supine flexion 133    UE Strength  Shoulder flexion     Shoulder abd 2-  Biceps 5  Triceps 5  ER  3+  IR 4      Treatments:   Ube5 min  Supine wand flexion 10 x 2  Supine chest press 10 x 2  Iso flexion, abd, ext 10 x 2 5 sec hold L shoulder  Wand flexion 10 x 2  Wand abd 10 x 2  Wand ext 10 x 2  Wand IR up back 10 x 2  PROM L shoulder: ER/IR, flexion, abd     Assessment: unable to lift arm against gravity.  Added wand exercises to HEP to improve deltoid strength and hopefully allow her to lift arm up better       Plan: continue 2 x a week aarom wash wall in 2 weeks begin strengthening

## 2023-10-30 NOTE — PATIENT INSTRUCTIONS
-Ice on and off for the next 24 hours if injection sites are sore. Do gentle range of motion exercises. Try to do them every hour for about half a minute or so, in every direction that the affected part goes. Avoid heavy lifting for the next 2 days.   -Ask your psychiatrist to consider going up on Cymbalta 60 mg  -Continue gabapentin for now, consider going up, you can do 300 mg 3 times per day, 600 mg twice a day etc.  Take the lowest most effective dose with the least amount of side effects  -consider Lyrica in the future   -Continue Jhonatan chi, pilates, home exercises, PT  -Continue with acupuncture as needed  -Sleep study ordered previously  -Follow up with Broderick Horton to discuss L5-S1 epidural steroid injections if you want  -OMT ordered previously: Dr. Carlos [phone] [facility], [facility] clinic [phone]  -Follow up as needed, you can message me on my chart
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
## 2023-11-07 ENCOUNTER — OFFICE VISIT (OUTPATIENT)
Dept: ORTHOPEDIC SURGERY | Facility: HOSPITAL | Age: 58
End: 2023-11-07
Payer: MEDICARE

## 2023-11-07 ENCOUNTER — APPOINTMENT (OUTPATIENT)
Dept: PHYSICAL THERAPY | Facility: CLINIC | Age: 58
End: 2023-11-07
Payer: MEDICARE

## 2023-11-07 DIAGNOSIS — G89.29 CHRONIC LEFT SHOULDER PAIN: Primary | ICD-10-CM

## 2023-11-07 DIAGNOSIS — M25.511 CHRONIC RIGHT SHOULDER PAIN: ICD-10-CM

## 2023-11-07 DIAGNOSIS — G89.29 CHRONIC RIGHT SHOULDER PAIN: ICD-10-CM

## 2023-11-07 DIAGNOSIS — M25.512 CHRONIC LEFT SHOULDER PAIN: Primary | ICD-10-CM

## 2023-11-07 PROCEDURE — 99024 POSTOP FOLLOW-UP VISIT: CPT | Performed by: ORTHOPAEDIC SURGERY

## 2023-11-07 PROCEDURE — 1036F TOBACCO NON-USER: CPT | Performed by: ORTHOPAEDIC SURGERY

## 2023-11-07 ASSESSMENT — PAIN SCALES - GENERAL: PAINLEVEL_OUTOF10: 10 - WORST POSSIBLE PAIN

## 2023-11-07 ASSESSMENT — PAIN - FUNCTIONAL ASSESSMENT: PAIN_FUNCTIONAL_ASSESSMENT: 0-10

## 2023-11-07 NOTE — PROGRESS NOTES
Subjective    Patient ID: Stephanie Castillo is a 58 y.o. female.    Chief Complaint: Bilateral shoulder pain R > L     Last Surgery: Left arthroscopic rotator cuff repair with arthroscopic biceps tenodesis.   Last Surgery Date: 08/25/23    She continues to struggle a little bit with her function.  Her other shoulder is also bothering her.  She is sleeping a little bit better.  She is early in the recovery.  She is doing therapy and seems to think that that is helping.    Objective   Patient is a well-developed, well-nourished female in no acute distress.  Breathes with normal chest rises.  Pupils are round and symmetric today.  Awake, alert, and oriented x3.      Examination of the right shoulder today reveals the skin to be intact. There is no sign of any atrophy, lesions, or abrasions. There is no pain to palpation of the bony prominences. Cervical lymphadenopathy examined, and this was negative. Patient had 5 out of 5 wrist flexion, extension, and thumb extension, bilaterally. Sensation was intact to light touch to median, ulnar, radial, axillary, and musculocutaneous nerves bilaterally. Positive radial pulse bilaterally.   Range of motion of the right shoulder revealed 0-170° of forward elevation, 0-60° of external rotation, and internal rotation up to T-12.  Positive Camarena sign positive Neer sign    Patient portal shoulder incisions are dry, intact and healing well. Minimal swelling. No warmth or erythema. No ecchymosis. Sutures were removed today and steris strips placed on incision sites on top of shoulder. Neurovascularly intact distally. 5 out of 5 wrist, hand and thumb flexion and extension without pain. Full active range of motion of elbow.  50 degrees of forward elevation 13 degrees passively of forward elevation. 20degrees external rotation. 3/5 Luan's testing    Image Results:        Assessment/Plan   Encounter Diagnoses:  No diagnosis found.  STEPHANIE is 6+ weeks status post left arthroscopic  rotator cuff repair, decompression, debridement and arthroscopic biceps tenodesis on 8/25/23.     She is quite stiff after surgery. I want her to focus on range of motion and really stretching out her shoulder. She should discard her sling at this time, and do not even sleep with it. She should not be lifting more than a coffee cup. She will continue to do physical therapy. We will see her back in 6 weeks for a repeat clinical check    No orders of the defined types were placed in this encounter.    Followup in 6 weeks.  She tolerated the injection well into the other shoulder.  We will see how she does with that.  Hopefully we do not have to do anything further.  I am also hoping she gets some progress in active range of motion we prescribed more physical therapy that would be formal allow her to do these things.    Scribe Attestation  By signing my name below, ILinda Scribe   attest that this documentation has been prepared under the direction and in the presence of Octavio Tompkins MD.

## 2023-11-08 ENCOUNTER — APPOINTMENT (OUTPATIENT)
Dept: PHYSICAL THERAPY | Facility: CLINIC | Age: 58
End: 2023-11-08
Payer: MEDICARE

## 2023-11-15 ENCOUNTER — TREATMENT (OUTPATIENT)
Dept: PHYSICAL THERAPY | Facility: CLINIC | Age: 58
End: 2023-11-15
Payer: MEDICARE

## 2023-11-15 DIAGNOSIS — G89.29 CHRONIC PAIN OF BOTH SHOULDERS: ICD-10-CM

## 2023-11-15 DIAGNOSIS — M25.511 CHRONIC PAIN OF BOTH SHOULDERS: ICD-10-CM

## 2023-11-15 DIAGNOSIS — M75.102 ROTATOR CUFF TEAR, LEFT: Primary | ICD-10-CM

## 2023-11-15 DIAGNOSIS — M25.512 CHRONIC PAIN OF BOTH SHOULDERS: ICD-10-CM

## 2023-11-15 PROCEDURE — 97140 MANUAL THERAPY 1/> REGIONS: CPT | Mod: GP,CQ | Performed by: SPECIALIST/TECHNOLOGIST

## 2023-11-15 PROCEDURE — 97016 VASOPNEUMATIC DEVICE THERAPY: CPT | Mod: GP,CQ | Performed by: SPECIALIST/TECHNOLOGIST

## 2023-11-15 PROCEDURE — 97110 THERAPEUTIC EXERCISES: CPT | Mod: GP,CQ | Performed by: SPECIALIST/TECHNOLOGIST

## 2023-11-15 ASSESSMENT — PAIN SCALES - GENERAL: PAINLEVEL_OUTOF10: 5 - MODERATE PAIN

## 2023-11-15 ASSESSMENT — PAIN - FUNCTIONAL ASSESSMENT: PAIN_FUNCTIONAL_ASSESSMENT: 0-10

## 2023-11-15 NOTE — PROGRESS NOTES
Physical Therapy  Physical Therapy Treatment    Patient Name: Stephanie Castillo  MRN: [id number]  Today's Date: 11/15/2023  Time Calculation  Start Time: 1345  Stop Time: 1445  Time Calculation (min): 60 min    Insurance:  Visit number: 8 of med nec   Authorization info: no auth needed  Insurance Type: Medicare/medicaid  Cert date start: 9/19/2023        Cert date end: 12/9/2023                General:  Reason for visit: L RTC repair on 8/25/2023  Referred by: GARO Tompkins MD    Current Problem  1. Rotator cuff tear, left        2. Chronic pain of both shoulders            Precautions  Post-Surgical Precautions: Left shoulder precautions    Pain Assessment: 0-10  Pain Score: 5 - Moderate pain    Subjective:   Patient notes arm motion has improved substantially since last session.   Patient notes pain 5 of 10 on arrival. Patient followed up with Dr. Tompkins who thought some people are stiff initially and if still having issues next MD follow-up will order MRI to check shoulder.  Patient now able to lift arm past horizontal.    HEP Performed:  Yes    Objective:   Shoulder PROM Flexion 125° Abd 90°; AROM Flex 100° (with wall slide 105°)     TTP biceps, delt, posterior shoulder, ticklish on rhomboids (portals 4 of 10)     Treatments:     Sci-Fit L2 3' F/R   Pulleys Flex/Scap 3'   Flexion/abduction  ball walk 10x (last 30 sec flexion/abd hold)   Wand ext/rot 20x   Iso flex/abd/ext 10x5s  Castellanos row 5# 20x R/L  Bravo stand ext 2.5# 20x   1# supine circles cw/ccw 20x, ceiling punch 20x   Supine wand AAROM flex 10x  (to 120°)  PROM L shoulder all planes 10 min   Cross fiber pecs   SS pecs   L shoulder mobs all planes 10 min   Game ready 10 min L shoulder     Charges: TE x2, Man, Vaso (cq)     Assessment: Patient tolerated intensity noting good effort with new scapular stability work.  Patient noted feeling mild soreness post exercise & manual work which improved with game ready.     Plan: Continue to improve ROM,  anterior flexibility and scapular stability to improve posture and ADL,         Kofi Pleitez, PTA

## 2023-11-21 ENCOUNTER — APPOINTMENT (OUTPATIENT)
Dept: RADIOLOGY | Facility: HOSPITAL | Age: 58
End: 2023-11-21
Payer: MEDICARE

## 2023-11-22 ENCOUNTER — TREATMENT (OUTPATIENT)
Dept: PHYSICAL THERAPY | Facility: CLINIC | Age: 58
End: 2023-11-22
Payer: MEDICARE

## 2023-11-22 DIAGNOSIS — M75.102 ROTATOR CUFF TEAR, LEFT: Primary | ICD-10-CM

## 2023-11-22 DIAGNOSIS — M25.512 CHRONIC PAIN OF BOTH SHOULDERS: ICD-10-CM

## 2023-11-22 DIAGNOSIS — M25.511 CHRONIC PAIN OF BOTH SHOULDERS: ICD-10-CM

## 2023-11-22 DIAGNOSIS — G89.29 CHRONIC PAIN OF BOTH SHOULDERS: ICD-10-CM

## 2023-11-22 PROCEDURE — 97016 VASOPNEUMATIC DEVICE THERAPY: CPT | Mod: GP,CQ | Performed by: SPECIALIST/TECHNOLOGIST

## 2023-11-22 PROCEDURE — 97110 THERAPEUTIC EXERCISES: CPT | Mod: GP,CQ | Performed by: SPECIALIST/TECHNOLOGIST

## 2023-11-22 PROCEDURE — 97140 MANUAL THERAPY 1/> REGIONS: CPT | Mod: GP,CQ | Performed by: SPECIALIST/TECHNOLOGIST

## 2023-11-22 ASSESSMENT — PAIN SCALES - GENERAL: PAINLEVEL_OUTOF10: 0 - NO PAIN

## 2023-11-22 ASSESSMENT — PAIN - FUNCTIONAL ASSESSMENT: PAIN_FUNCTIONAL_ASSESSMENT: 0-10

## 2023-11-22 NOTE — PROGRESS NOTES
Physical Therapy  Physical Therapy Treatment    Patient Name: Stephanie Castillo  MRN: [id number]  Today's Date: 11/22/2023  Time Calculation  Start Time: 1345  Stop Time: 1440  Time Calculation (min): 55 min    Insurance:  Visit number: 9 of med nec   Authorization info: no auth needed  Insurance Type: Medicare/medicaid  Cert date start: 9/19/2023        Cert date end: 12/9/2023                General:  Reason for visit: L RTC repair on 8/25/2023  Referred by: GARO Tompkins MD    Current Problem  1. Rotator cuff tear, left        2. Chronic pain of both shoulders              Precautions  Post-Surgical Precautions: Left shoulder precautions    Pain Assessment: 0-10  Pain Score: 0 - No pain    Subjective:   Patient notes much less soreness after last session and no pain on arrival.  Patient notes still having pain at night although none during the day.     HEP Performed:  Yes    Objective:   Shoulder PROM Flexion 125° Abd 90°; AROM Flex 100° (with wall slide 105°)     TTP biceps, delt, posterior shoulder, ticklish on rhomboids (portals 4 of 10)     Treatments:     Sci-Fit L2 3' F/R   Pulleys Flex/Scap 3'   Flexion/abduction  ball walk 10x (last 30 sec flexion/abd hold)   Wand ext/rot 20x   1# flex/scap/abd 2x10   Bravo row 7.5# 20x R/L  Bravo stand ext 5# 20x   1# supine circles cw/ccw 20x, ceiling punch 20x   Supine wand AAROM flex 10x  (to 120°)  PROM L shoulder all planes 10 min   Cross fiber pecs   SS pecs   L shoulder mobs all planes 10 min   Game ready 10 min L shoulder     Charges: TE x2, Man, Vaso (cq)     Assessment: Patient tolerated intensity noting feeling good post treatment.  Added prime movers without problems.     Plan: Continue to improve ROM, anterior flexibility and scapular stability to improve posture and ADL,         Kofi Pleitez, PTA

## 2023-12-04 ENCOUNTER — APPOINTMENT (OUTPATIENT)
Dept: PRIMARY CARE | Facility: CLINIC | Age: 58
End: 2023-12-04
Payer: MEDICARE

## 2023-12-05 ENCOUNTER — APPOINTMENT (OUTPATIENT)
Dept: RADIOLOGY | Facility: HOSPITAL | Age: 58
End: 2023-12-05
Payer: MEDICARE

## 2023-12-05 ENCOUNTER — APPOINTMENT (OUTPATIENT)
Dept: ORTHOPEDIC SURGERY | Facility: HOSPITAL | Age: 58
End: 2023-12-05
Payer: MEDICARE

## 2023-12-07 ENCOUNTER — APPOINTMENT (OUTPATIENT)
Dept: PHYSICAL THERAPY | Facility: CLINIC | Age: 58
End: 2023-12-07
Payer: MEDICARE

## 2023-12-11 ENCOUNTER — TREATMENT (OUTPATIENT)
Dept: PHYSICAL THERAPY | Facility: CLINIC | Age: 58
End: 2023-12-11
Payer: MEDICARE

## 2023-12-11 DIAGNOSIS — M25.511 CHRONIC PAIN OF BOTH SHOULDERS: ICD-10-CM

## 2023-12-11 DIAGNOSIS — G89.29 CHRONIC PAIN OF BOTH SHOULDERS: ICD-10-CM

## 2023-12-11 DIAGNOSIS — M75.102 ROTATOR CUFF TEAR, LEFT: Primary | ICD-10-CM

## 2023-12-11 DIAGNOSIS — M25.512 CHRONIC PAIN OF BOTH SHOULDERS: ICD-10-CM

## 2023-12-11 PROCEDURE — 97140 MANUAL THERAPY 1/> REGIONS: CPT | Mod: GP,CQ | Performed by: SPECIALIST/TECHNOLOGIST

## 2023-12-11 PROCEDURE — 97016 VASOPNEUMATIC DEVICE THERAPY: CPT | Mod: GP,CQ | Performed by: SPECIALIST/TECHNOLOGIST

## 2023-12-11 PROCEDURE — 97110 THERAPEUTIC EXERCISES: CPT | Mod: GP,CQ | Performed by: SPECIALIST/TECHNOLOGIST

## 2023-12-11 ASSESSMENT — PAIN SCALES - GENERAL: PAINLEVEL_OUTOF10: 0 - NO PAIN

## 2023-12-11 ASSESSMENT — PAIN - FUNCTIONAL ASSESSMENT: PAIN_FUNCTIONAL_ASSESSMENT: 0-10

## 2023-12-11 NOTE — PROGRESS NOTES
Physical Therapy  Physical Therapy Treatment    Patient Name: Stephanie Castillo  MRN: [id number]  Today's Date: 12/11/2023  Time Calculation  Start Time: 1515  Stop Time: 1615  Time Calculation (min): 60 min    Insurance:  Visit number: 10 of med nec   Authorization info: no auth needed  Insurance Type: Medicare/medicaid  Cert date start: 9/19/2023       Cert date end: 12/9/2023              Cert Update 12/10/2023 Cert end date 3/10/2024    General:  Reason for visit: L RTC repair on 8/25/2023  Referred by: GARO Tompkins MD    Current Problem  1. Rotator cuff tear, left        2. Chronic pain of both shoulders            Precautions  Post-Surgical Precautions: Left shoulder precautions    Pain Assessment: 0-10  Pain Score: 0 - No pain    Subjective:   Patient notes having pain in AM after getting up.  Patient notes no pain on arrival and notes soreness 24-48 hours after last session.      HEP Performed:  Yes    Objective:   Shoulder ARM Flexion 110° Abd 87°   Supine AAROM Flexion 135°  Supine PROM Ext Rot 30°      TTP biceps, delt, posterior shoulder, ticklish on rhomboids (portals 4 of 10)   Quickdash 31 improved from 39 at IE.     Treatments:     Sci-Fit L2 3' F/R   Pulleys Flex/Scap 3'   Flexion/abduction  ball walk 10x (last 30 sec flexion/abd hold)   Wand ext/rot 20x   1# flex/scap/abd 2x10   Bravo row 7.5# 20x R/L  Bravo stand ext 5# 20x   1# supine circles cw/ccw 20x, ceiling punch 20x   Supine wand AAROM flex 10x  (to 120°)  PROM L shoulder all planes 10 min   Cross fiber pecs   SS pecs   L shoulder mobs all planes 10 min   Game ready 10 min L shoulder     Charges: TE x2, Man, Vaso (cq)     Assessment: Patient tolerated intensity noting feeling good post treatment.  Patient improved Quick Dash by MCID and improved AROM/PROM compared to IE.      Plan: Continue to improve ROM, anterior flexibility and scapular stability to improve posture and ADL.  Extend end date into spring.  Patient has follow-up with  Janet Agudelo, PT on 1/2/2024 due to PT being out 2 weeks in late December.        Kofi Pleitez, PTA

## 2023-12-13 ENCOUNTER — TREATMENT (OUTPATIENT)
Dept: PHYSICAL THERAPY | Facility: CLINIC | Age: 58
End: 2023-12-13
Payer: MEDICARE

## 2023-12-13 DIAGNOSIS — M25.511 CHRONIC PAIN OF BOTH SHOULDERS: ICD-10-CM

## 2023-12-13 DIAGNOSIS — M25.512 CHRONIC PAIN OF BOTH SHOULDERS: ICD-10-CM

## 2023-12-13 DIAGNOSIS — G89.29 CHRONIC PAIN OF BOTH SHOULDERS: ICD-10-CM

## 2023-12-13 DIAGNOSIS — M75.102 ROTATOR CUFF TEAR, LEFT: Primary | ICD-10-CM

## 2023-12-13 PROCEDURE — 97140 MANUAL THERAPY 1/> REGIONS: CPT | Mod: GP,CQ | Performed by: SPECIALIST/TECHNOLOGIST

## 2023-12-13 PROCEDURE — 97016 VASOPNEUMATIC DEVICE THERAPY: CPT | Mod: GP,CQ | Performed by: SPECIALIST/TECHNOLOGIST

## 2023-12-13 PROCEDURE — 97110 THERAPEUTIC EXERCISES: CPT | Mod: GP,CQ | Performed by: SPECIALIST/TECHNOLOGIST

## 2023-12-13 ASSESSMENT — PAIN - FUNCTIONAL ASSESSMENT: PAIN_FUNCTIONAL_ASSESSMENT: 0-10

## 2023-12-13 ASSESSMENT — PAIN SCALES - GENERAL: PAINLEVEL_OUTOF10: 5 - MODERATE PAIN

## 2023-12-13 NOTE — PROGRESS NOTES
Physical Therapy  Physical Therapy Treatment    Patient Name: Stephanie Castillo  MRN: [id number]  Today's Date: 12/13/2023  Time Calculation  Start Time: 1515    Insurance:  Visit number: 11 of med nec   Authorization info: no auth needed  Insurance Type: Medicare/medicaid  Cert date start: 9/19/2023       Cert date end: 12/9/2023              Cert Update 12/10/2023 Cert end date 3/10/2024    General:  Reason for visit: L RTC repair on 8/25/2023  Referred by: GARO Tompkins MD    Current Problem  1. Rotator cuff tear, left        2. Chronic pain of both shoulders            Precautions  Post-Surgical Precautions: Left shoulder precautions    Pain Assessment: 0-10  Pain Score: 5 - Moderate pain    Subjective:   Patient notes still having pain in AM after getting up.  Patient notes some pain on arrival today (5 of 10) and notes soreness after last session.      HEP Performed:  Yes    Objective:   Shoulder ARM Flexion 110° Abd 87°   Supine AAROM Flexion 135°  Supine PROM Ext Rot 30°    TTP biceps, delt, posterior shoulder, ticklish on rhomboids (portals 4 of 10)   Quickdash 31 improved from 39 at IE.     Treatments:     Sci-Fit L2 3' F/R   Pulleys Flex/Scap 2'   Flexion/abduction  ball walk 10x (last 30 sec flexion/abd hold)   Wand ext/rot 20x   1# flex/scap/abd 2x10   Bravo row 7.5# 20x R/L  Bravo stand ext 5# 20x   1# supine circles cw/ccw 20x, ceiling punch 20x   Supine wand AAROM flex 10x  (to 120°)  PROM L shoulder all planes 10 min   Cross fiber pecs   SS pecs   L shoulder mobs all planes 10 min   Game ready 10 min L shoulder     Charges: TE x2, Man, Vaso (cq)     Assessment: Patient tolerated intensity noting feeling good post treatment.      Plan: Continue to improve ROM, anterior flexibility and scapular stability to improve posture and ADL.  Patient has follow-up with Janet Agudelo PT on 1/2/2024 due to PT being out 2 weeks in late December.        Kofi Pleitez, PTA

## 2023-12-15 ENCOUNTER — APPOINTMENT (OUTPATIENT)
Dept: ORTHOPEDIC SURGERY | Facility: CLINIC | Age: 58
End: 2023-12-15
Payer: MEDICARE

## 2023-12-18 ENCOUNTER — TREATMENT (OUTPATIENT)
Dept: PHYSICAL THERAPY | Facility: CLINIC | Age: 58
End: 2023-12-18
Payer: MEDICARE

## 2023-12-18 DIAGNOSIS — M25.512 CHRONIC PAIN OF BOTH SHOULDERS: ICD-10-CM

## 2023-12-18 DIAGNOSIS — G89.29 CHRONIC PAIN OF BOTH SHOULDERS: ICD-10-CM

## 2023-12-18 DIAGNOSIS — M75.102 ROTATOR CUFF TEAR, LEFT: Primary | ICD-10-CM

## 2023-12-18 DIAGNOSIS — M25.511 CHRONIC PAIN OF BOTH SHOULDERS: ICD-10-CM

## 2023-12-18 PROCEDURE — 97110 THERAPEUTIC EXERCISES: CPT | Mod: GP,CQ | Performed by: SPECIALIST/TECHNOLOGIST

## 2023-12-18 PROCEDURE — 97016 VASOPNEUMATIC DEVICE THERAPY: CPT | Mod: GP,CQ | Performed by: SPECIALIST/TECHNOLOGIST

## 2023-12-18 PROCEDURE — 97140 MANUAL THERAPY 1/> REGIONS: CPT | Mod: GP,CQ | Performed by: SPECIALIST/TECHNOLOGIST

## 2023-12-18 ASSESSMENT — PAIN SCALES - GENERAL: PAINLEVEL_OUTOF10: 4

## 2023-12-18 ASSESSMENT — PAIN - FUNCTIONAL ASSESSMENT: PAIN_FUNCTIONAL_ASSESSMENT: 0-10

## 2023-12-18 NOTE — PROGRESS NOTES
Physical Therapy  Physical Therapy Treatment    Patient Name: Stephanie Castillo  MRN: [id number]  Today's Date: 12/18/2023  Time Calculation  Start Time: 1430  Stop Time: 1527  Time Calculation (min): 57 min    Insurance:  Visit number: 12 of med nec   Authorization info: no auth needed  Insurance Type: Medicare/medicaid  Cert date start: 9/19/2023       Cert date end: 12/9/2023              Cert Update 12/10/2023 Cert end date 3/10/2024    General:  Reason for visit: L RTC repair on 8/25/2023  Referred by: GARO Tompkins MD    Current Problem  1. Rotator cuff tear, left        2. Chronic pain of both shoulders              Precautions  Post-Surgical Precautions: Left shoulder precautions    Pain Assessment: 0-10  Pain Score: 4    Subjective:   Patient notes pain 4 of 10 on arrival and less soreness after last session then previous sessions.      HEP Performed:  Yes    Objective:   Shoulder AROM Flexion 115° Abd 90°   Supine AAROM Flexion 135°  Supine PROM Ext Rot 30°    TTP biceps, delt, posterior shoulder, ticklish on rhomboids (portals 4 of 10)   Quickdash 31 improved from 39 at IE.     Treatments:     Sci-Fit L2 3' F/R   Pulleys Flex/Scap 2.5'   Flexion/abduction  ball walk 10x (last 30 sec flexion/abd hold)   Wand ext/rot 20x   1# flex/scap/abd 2x10   Bravo row 10# 20x R/L  Castellanos stand ext 7.5# 20x   1# supine circles cw/ccw 20x, ceiling punch 20x   Supine wand AAROM flex 10x  (to 120°)  PROM L shoulder all planes 10 min   Cross fiber pecs   SS pecs   L shoulder mobs all planes 10 min   Game ready 10 min L shoulder     Charges: TE x2, Man, Vaso (cq)     Assessment: Patient tolerated increased intensity noting feeling good post treatment.      Plan: Continue to improve ROM, anterior flexibility and scapular stability to improve posture and ADL.  Patient has follow-up with Janet Agudelo PT on 1/2/2024 due to PT being out 2 weeks in late December.        Kofi Pleitez, PTA

## 2023-12-20 ENCOUNTER — TREATMENT (OUTPATIENT)
Dept: PHYSICAL THERAPY | Facility: CLINIC | Age: 58
End: 2023-12-20
Payer: MEDICARE

## 2023-12-20 PROCEDURE — 97110 THERAPEUTIC EXERCISES: CPT | Mod: GP,CQ | Performed by: SPECIALIST/TECHNOLOGIST

## 2023-12-20 PROCEDURE — 97140 MANUAL THERAPY 1/> REGIONS: CPT | Mod: GP,CQ | Performed by: SPECIALIST/TECHNOLOGIST

## 2023-12-20 PROCEDURE — 97016 VASOPNEUMATIC DEVICE THERAPY: CPT | Mod: GP,CQ | Performed by: SPECIALIST/TECHNOLOGIST

## 2023-12-20 ASSESSMENT — PAIN SCALES - GENERAL: PAINLEVEL_OUTOF10: 10 - WORST POSSIBLE PAIN

## 2023-12-20 ASSESSMENT — PAIN - FUNCTIONAL ASSESSMENT: PAIN_FUNCTIONAL_ASSESSMENT: 0-10

## 2023-12-20 NOTE — PROGRESS NOTES
Physical Therapy  Physical Therapy Treatment    Patient Name: Stephanie Castillo  MRN: [id number]  Today's Date: 12/20/2023  Time Calculation  Start Time: 1430  Stop Time: 1530  Time Calculation (min): 60 min    Insurance:  Visit number: 12 of med nec   Authorization info: no auth needed  Insurance Type: Medicare/medicaid  Cert date start: 9/19/2023       Cert date end: 12/9/2023              Cert Update 12/10/2023 Cert end date 3/10/2024    General:  Reason for visit: L RTC repair on 8/25/2023  Referred by: GARO Tompkins MD    Current Problem  No diagnosis found.        Precautions  Post-Surgical Precautions: Left shoulder precautions    Pain Assessment: 0-10  Pain Score: 10 - Worst possible pain    Subjective:   Patient notes pain 10 of 10 on arrival not sure why she is so much more painful today.  Patient noted increased soreness after last session which continues.        HEP Performed:  Yes    Objective:   Shoulder AROM Flexion 118° Abd 108°   Supine AAROM Flexion 135°  Supine PROM Ext Rot 30°    TTP biceps, delt, posterior shoulder, ticklish on rhomboids (portals 4 of 10)       Treatments:     Sci-Fit L2 3' F/R   Swisswing L delt, L axilla (pain decreased to 5 of 10)   Pulleys Flex/Scap 2'   Flexion/abduction  ball walk 10x (last 30 sec flexion/abd hold)   Wand ext/rot 20x   1# flex/scap/abd 2x10   Bravo row 7.5# 20x R/L  Bravo stand ext 5# 20x   1# supine circles cw/ccw 20x, ceiling punch 20x   Supine wand AAROM flex 10x  (to 120°)  PROM L shoulder all planes 10 min   Cross fiber pecs   SS pecs   L shoulder mobs all planes 10 min   Game ready 10 min L shoulder     Charges: TE x2, Man, Vaso (cq)     Assessment: Patient tolerated increased intensity noting swisswing initially decreased soreness and game ready further improved discomfort.  Patient notes active abduction seemed to increase discomfort (returned to session before last intensity on rows and ext without problems).  Patient noted less discomfort  immediately post treatment.     Plan: Continue to improve ROM, anterior flexibility and scapular stability to improve posture and ADL.  Patient has follow-up with Janet Agudelo, PT on 1/2/2024 due to PT being out 2 weeks in late December.        Kofi Pleitez, PTA

## 2023-12-21 ENCOUNTER — APPOINTMENT (OUTPATIENT)
Dept: ORTHOPEDIC SURGERY | Facility: HOSPITAL | Age: 58
End: 2023-12-21
Payer: MEDICARE

## 2024-01-02 ENCOUNTER — APPOINTMENT (OUTPATIENT)
Dept: PHYSICAL THERAPY | Facility: CLINIC | Age: 59
End: 2024-01-02
Payer: MEDICARE

## 2024-01-09 ENCOUNTER — APPOINTMENT (OUTPATIENT)
Dept: ORTHOPEDIC SURGERY | Facility: HOSPITAL | Age: 59
End: 2024-01-09
Payer: MEDICARE

## 2024-01-28 NOTE — PATIENT INSTRUCTIONS
-Ice on and off for the next 24 hours if injection sites are sore. Do gentle range of motion exercises. Try to do them every hour for about half a minute or so, in every direction that the affected part goes. Avoid heavy lifting for the next 2 days.   -Ask your psychiatrist to consider going up on Cymbalta 60 mg  -consider gabapentin again, Lyrica in the future   -Continue Jhonatan chi, pilates, home exercises, PT  -Continue with acupuncture as needed  -Sleep study ordered previously  -PT referral: Janet Agudelo,  Linda Mcneil, Skye Llanes  -Follow up with Broderick Horton to discuss L5-S1 epidural steroid injections if you want  -OMT ordered previously: Dr. Carlos [phone] [facility], [facility] OMT clinic [phone]  -Follow up as needed, you can message me on my chart
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
## 2024-01-29 ENCOUNTER — OFFICE VISIT (OUTPATIENT)
Dept: PHYSICAL MEDICINE AND REHAB | Facility: CLINIC | Age: 59
End: 2024-01-29
Payer: MEDICARE

## 2024-01-29 VITALS
DIASTOLIC BLOOD PRESSURE: 70 MMHG | BODY MASS INDEX: 39.49 KG/M2 | HEIGHT: 65 IN | SYSTOLIC BLOOD PRESSURE: 114 MMHG | OXYGEN SATURATION: 96 % | WEIGHT: 237 LBS | HEART RATE: 91 BPM | TEMPERATURE: 97.1 F

## 2024-01-29 DIAGNOSIS — M53.3 SACROILIAC JOINT DYSFUNCTION OF BOTH SIDES: ICD-10-CM

## 2024-01-29 DIAGNOSIS — G89.29 CHRONIC NECK PAIN: ICD-10-CM

## 2024-01-29 DIAGNOSIS — M79.7 FIBROMYALGIA: ICD-10-CM

## 2024-01-29 DIAGNOSIS — M54.16 LUMBAR RADICULITIS: ICD-10-CM

## 2024-01-29 DIAGNOSIS — M25.561 CHRONIC PAIN OF RIGHT KNEE: ICD-10-CM

## 2024-01-29 DIAGNOSIS — M54.2 CHRONIC NECK PAIN: ICD-10-CM

## 2024-01-29 DIAGNOSIS — M54.50 CHRONIC BILATERAL LOW BACK PAIN WITHOUT SCIATICA: ICD-10-CM

## 2024-01-29 DIAGNOSIS — G89.29 CHRONIC PAIN OF BOTH SHOULDERS: Primary | ICD-10-CM

## 2024-01-29 DIAGNOSIS — M25.859 FEMOROACETABULAR IMPINGEMENT: ICD-10-CM

## 2024-01-29 DIAGNOSIS — M25.511 CHRONIC PAIN OF BOTH SHOULDERS: Primary | ICD-10-CM

## 2024-01-29 DIAGNOSIS — M25.512 CHRONIC PAIN OF BOTH SHOULDERS: Primary | ICD-10-CM

## 2024-01-29 DIAGNOSIS — M79.18 MYOFASCIAL PAIN SYNDROME: ICD-10-CM

## 2024-01-29 DIAGNOSIS — M54.6 PAIN IN THORACIC SPINE: ICD-10-CM

## 2024-01-29 DIAGNOSIS — G89.29 CHRONIC BILATERAL LOW BACK PAIN WITHOUT SCIATICA: ICD-10-CM

## 2024-01-29 DIAGNOSIS — G89.29 CHRONIC PAIN OF RIGHT KNEE: ICD-10-CM

## 2024-01-29 PROCEDURE — 20553 NJX 1/MLT TRIGGER POINTS 3/>: CPT | Performed by: PHYSICAL MEDICINE & REHABILITATION

## 2024-01-29 PROCEDURE — 99213 OFFICE O/P EST LOW 20 MIN: CPT | Performed by: PHYSICAL MEDICINE & REHABILITATION

## 2024-01-29 PROCEDURE — 1036F TOBACCO NON-USER: CPT | Performed by: PHYSICAL MEDICINE & REHABILITATION

## 2024-01-29 ASSESSMENT — PAIN SCALES - GENERAL: PAINLEVEL: 8

## 2024-01-31 ENCOUNTER — TREATMENT (OUTPATIENT)
Dept: PHYSICAL THERAPY | Facility: CLINIC | Age: 59
End: 2024-01-31
Payer: MEDICARE

## 2024-01-31 DIAGNOSIS — M25.511 CHRONIC PAIN OF BOTH SHOULDERS: ICD-10-CM

## 2024-01-31 DIAGNOSIS — M25.512 CHRONIC PAIN OF BOTH SHOULDERS: ICD-10-CM

## 2024-01-31 DIAGNOSIS — M75.102 ROTATOR CUFF TEAR, LEFT: ICD-10-CM

## 2024-01-31 DIAGNOSIS — G89.29 CHRONIC PAIN OF BOTH SHOULDERS: ICD-10-CM

## 2024-01-31 PROCEDURE — 97110 THERAPEUTIC EXERCISES: CPT | Mod: GP

## 2024-01-31 ASSESSMENT — PAIN SCALES - GENERAL: PAINLEVEL_OUTOF10: 5 - MODERATE PAIN

## 2024-01-31 NOTE — PROGRESS NOTES
Physical Therapy Treatment    Patient Name: Stephanie Castillo  MRN: [id number]  Today's Date: 1/31/2024  Time Calculation  Start Time: 1815  Stop Time: 1900  Time Calculation (min): 45 min    Insurance:   Visit number: 1 (12 of med [facility] in 2023)  Authorization info: no auth needed  Insurance Type: Medicare/medicaid  Cert date start: 9/19/2023       Cert date end: 12/9/2023              Cert Update 12/10/2023         Cert end date 3/10/2024    Current Problem  1. Chronic pain of both shoulders  Follow Up In Physical Therapy      2. Rotator cuff tear, left  Follow Up In Physical Therapy          General  Reason for Referral: RTC repair  Referred By: Turner Long  Precautions  Precautions Comment: none  Pain  Pain Score: 5 - Moderate pain    Subjective:   Subjective   Patient reports saw Dr. Cr yesterday and got a new referral for lots of different things. Just had injection in shoulders and back Monday.  Pain about 5/10.  ROM has improved but doesn't really use the left arm much.    At last MD appointment will be doing another xray at her next appointment.     Compliant with HEP? Yes     Objective:   Objective   Shoulder ROM  Flexion 156/ 101  Abd 154/90  ER 60  IR 60  Behind back  T/12 / L glute  Supine flexion 135    UE Strength  Shoulder flexion 4!!/4!!  Shoulder abd 4!!/4!!  Biceps 5/5   Triceps 5/5  ER 3/4  IR 5/5      Treatments:   UBE 6 min  Behind back stretch 10 x L  Sleeper's stretch  10 x L  Supine shoulder flexion 2# 10 x 2  Scap protraction 2# 10 x 2  Horizontal abd GTB 12 x 3  D2 flexion GTB 12 x 2 L  PROM flex, abd, ER, IR  Wand behind back IR and abd x 15    Assessment: improved ROM still stiff and would benefit from continued therapy to improve flexibility and motion.        Plan: continue PROM, strengthening

## 2024-02-05 ENCOUNTER — APPOINTMENT (OUTPATIENT)
Dept: PHYSICAL THERAPY | Facility: CLINIC | Age: 59
End: 2024-02-05

## 2024-02-20 ENCOUNTER — HOSPITAL ENCOUNTER (OUTPATIENT)
Dept: RADIOLOGY | Facility: HOSPITAL | Age: 59
Discharge: HOME | End: 2024-02-20
Payer: MEDICARE

## 2024-02-20 ENCOUNTER — OFFICE VISIT (OUTPATIENT)
Dept: ORTHOPEDIC SURGERY | Facility: HOSPITAL | Age: 59
End: 2024-02-20
Payer: MEDICARE

## 2024-02-20 VITALS — HEIGHT: 65 IN | BODY MASS INDEX: 36.65 KG/M2 | WEIGHT: 220 LBS

## 2024-02-20 DIAGNOSIS — M25.511 CHRONIC RIGHT SHOULDER PAIN: ICD-10-CM

## 2024-02-20 DIAGNOSIS — M25.511 RIGHT SHOULDER PAIN, UNSPECIFIED CHRONICITY: Primary | ICD-10-CM

## 2024-02-20 DIAGNOSIS — M75.102 TEAR OF LEFT ROTATOR CUFF, UNSPECIFIED TEAR EXTENT, UNSPECIFIED WHETHER TRAUMATIC: ICD-10-CM

## 2024-02-20 DIAGNOSIS — G89.29 CHRONIC RIGHT SHOULDER PAIN: ICD-10-CM

## 2024-02-20 PROCEDURE — 99213 OFFICE O/P EST LOW 20 MIN: CPT | Performed by: NURSE PRACTITIONER

## 2024-02-20 PROCEDURE — 1036F TOBACCO NON-USER: CPT | Performed by: NURSE PRACTITIONER

## 2024-02-20 PROCEDURE — 2500000004 HC RX 250 GENERAL PHARMACY W/ HCPCS (ALT 636 FOR OP/ED): Performed by: NURSE PRACTITIONER

## 2024-02-20 PROCEDURE — 73030 X-RAY EXAM OF SHOULDER: CPT | Mod: RIGHT SIDE | Performed by: RADIOLOGY

## 2024-02-20 PROCEDURE — 73030 X-RAY EXAM OF SHOULDER: CPT | Mod: RT

## 2024-02-20 PROCEDURE — 2500000005 HC RX 250 GENERAL PHARMACY W/O HCPCS: Performed by: NURSE PRACTITIONER

## 2024-02-20 NOTE — PROGRESS NOTES
Provider Impression/Assessment:  Stephanie Castillo is 6 months status post LEFT arthroscopic rotator cuff repair. Right shoulder pain consistent with rotator cuff tendinitis and biceps tendinitis, completed on 8/25/23. She tolerated the injection well today for her right shoulder.     Patient Discussion/Plan:  Stephanie Castillo will now initiate strengthening with physical therapy. A new prescription was given today. They can start to lift up to 5-10 pounds as tolerated with therapy. They will continue to focus on range of motion and scapular stabilizers daily at home. They should continue to use caution with overhead lift. They should be seen again in clinic in 8 weeks for a repeat clinical check and likely progression of strengthening activities. They understand that it can take up through a full year to get the full benefit from surgery. A follow up appointment was made for Stephanie Castillo today. She will work with PT for both of her shoulders at this point. She tolerated the injection well for her right shoulder today.     We have referred her to our PMR colleague for her overall pain. It is okay for her now to return to [facility] for her lower extremity.     Procedure  Risks, benefits and alternatives of injection discussed with the patient prior to injection. After receiving verbal informed consent from the patient, I sterilely prepped the RIGHT shoulder posteriorly and under sterile conditions injected 40mg of Kenalog and 4ml of 1% lidocaine into the posterior subacromial space. Injection site wiped with a sterile gauze after procedure and Band-Aid placed. The patient tolerated the procedure well and without complication. They can use ice on injection site if discomfort following injection today. Allow 24-48 hours for the injection to start to relieve pain and discomfort of the shoulder. Limit overhead and lifting activities for 48 hours.    L Inj/Asp: R glenohumeral on 2/29/2024 11:24  PM  Indications: pain  Details: 21 G needle, posterior approach  Medications: 40 mg triamcinolone acetonide 40 mg/mL; 4 mL lidocaine 10 mg/mL (1 %)  Procedure, treatment alternatives, risks and benefits explained, specific risks discussed. Consent was given by the patient. Immediately prior to procedure a time out was called to verify the correct patient, procedure, equipment, support staff and site/side marked as required. Patient was prepped and draped in the usual sterile fashion.       All patient's questions were answered today to their satisfaction and they are in agreement with the above plan.     Patient was discussed with Dr Tompkins and he agrees with the above plan.    Should you have any questions or concerns after your visit today, please do not hesitate to call the office at [phone]. We strive to give you high-quality, patient-centered, collaborative care and I am honored to be a part of your health care team.    --------------------------------------------------------------------------------------------------------  CHIEF COMPLAINT:  POV: LEFT RCR  DOS: 8/25/23  FUV RIGHT SHOULDER PAIN/INJECTION    History of Present Illness:  FELA ESPITIA is a pleasant 58 year old female who returns to clinic for her first postoperative visit. She is status post left arthroscopic rotator cuff repair and arthroscopic biceps tenodesis on 8/25/23.      She reports doing well after surgery. Pain is well managed during the day, worse at night. She continues to use narcotic pain medication as needed. Alternating with Over-the-counter pain medication. Using ice daily. She is not sleeping without difficulty. She denies redness or warmth at incision site. Denies any fever, chills, or paresthesia. There is some intermittent pain that waxes and wanes between moderate and mild severity. She has been very compliant with restrictions. Presents today wearing her sling. Doing well with daily home therapy for elbow,  "wrist and hand. She is requesting additional pain medication today.     2/20/24 - STEPHANIE ZARCO returns to clinic for bilateral shoulder pain. She is 6 months S/P Left Shoulder Arthroscopic Decompression; Debridement; Rotator Cuff Repair and Biceps Tenodesis, done 08/25/23. Stephanie is still experiencing pain in her left shoulder and states that she is struggling to schedule physical therapy at [facility]. She would like to do therapy at another location. Stephanie reports right shoulder pain, as well. XR today. She is requesting a CSI today. She believes that this pain is stemming from \"over working\" her right shoulder at physical therapy. Stephanie denies any new injury or trauma to either shoulder.     Review of Systems:   Review of systems for all 14 systems is negative for complaint except for the above noted       findings in the history of present illness.    Allergies:  Allergies   Allergen Reactions    Amoxicillin-Pot Clavulanate Unknown    Bee Pollen Unknown    Cat Dander Unknown    Dog Dander Unknown    Grass Pollen Unknown    Other Unknown     seasonal    Ragweed Pollen Unknown       Medications:    Current Outpatient Medications:     albuterol 90 mcg/actuation inhaler, Inhale., Disp: , Rfl:     azelastine (Astelin) 137 mcg (0.1 %) nasal spray, Administer into affected nostril(s)., Disp: , Rfl:     cycloSPORINE (Restasis MultiDose) 0.05 % drops, Administer into affected eye(s) every 12 hours., Disp: , Rfl:     diphenoxylate-atropine (Lomotil) 2.5-0.025 mg tablet, Take 1 tablet by mouth 2 times a day as needed for diarrhea., Disp: 30 tablet, Rfl: 0    doxycycline (Vibra-Tabs) 100 mg tablet, 1 tablet (100 mg)., Disp: , Rfl:     DULoxetine (Cymbalta) 30 mg DR capsule, Take 1 capsule (30 mg) by mouth once daily., Disp: , Rfl:     ferrous gluconate 324 (38 Fe) mg tablet, Take 1 tablet (324 mg) by mouth once daily with breakfast., Disp: , Rfl:     fluocinonide 0.05 % cream, Apply 1 Application topically., Disp: , Rfl:     " fluticasone (Flonase) 50 mcg/actuation nasal spray, , Disp: , Rfl:     furosemide (Lasix) 20 mg tablet, Take 1 tablet (20 mg) by mouth once daily. (Patient not taking: Reported on 1/29/2024), Disp: 90 tablet, Rfl: 0    gabapentin (Neurontin) 300 mg capsule, Take 1 capsule (300 mg) by mouth 2 times a day., Disp: , Rfl:     levocetirizine (Xyzal) 5 mg tablet, Take 1 tablet (5 mg) by mouth once daily., Disp: 90 tablet, Rfl: 1    levothyroxine (Synthroid, Levoxyl) 75 mcg tablet, take 1 tablet by mouth once daily, Disp: 90 tablet, Rfl: 3    ondansetron (Zofran) 4 mg tablet, Take 1 tablet (4 mg) by mouth every 8 hours if needed for nausea or vomiting., Disp: 30 tablet, Rfl: 1    valACYclovir (Valtrex) 500 mg tablet, Take 1 tablet (500 mg) by mouth once daily., Disp: 90 tablet, Rfl: 3    Diagnoses/Problems:  Left rotator cuff tear  Right shoulder tendinitis    Physical Examination:  Stephanie Castillo is a well-developed well-nourished female in no acute distress. Breathes with normal chest rises. Eye exam reveals round pupils. Awake alert and oriented x3.     Examination of right shoulder reveals range of motion 0-160° of forward elevation. 0-60° of external rotation. Internal rotation was to T12. Mild pain with palpation over bicipital groove. Skin is intact. No edema. No ecchymosis. No erythema. Jobes test is 5 out of 5. Positive Neers test. Positive Camarena sign.     Examination of left shoulder reveals incisions are dry, intact and well healed. No swelling. No warmth or erythema. No ecchymosis. Minimal pain with internal/external rotation. Neurovascularly intact distally. 5 out of 5 wrist, hand and thumb flexion and extension without pain. Full active range of motion of elbow. They can actively elevate to about 140° of forward elevation, passively correctable to about 160° without guarding. External rotations of 30°. Internal rotation to L3, passively to T12.  Jobes testing 4/5  Internal resistance 5/5  External  resistance 5/5    Results/Imaging:  X-ray today of RIGHT shoulder shows no signs of any fracture, dislocation or arthritis. Mild degenerative changes without calcium deposits, or tumors. I personally spent time viewing digital images of the radiology testing with the patient. I explained the results to the patient, along with suggested explanation for follow up recommendations based on testing and clinical results. Radiology results discussed with Dr. Tompkins.      *While intending to generate a timely document that accurately reflects the content of the visit, no guarantee can be provided that every grammatical or spelling mistake has been or will be identified or corrected. Thank you for your understanding.

## 2024-02-26 ENCOUNTER — DOCUMENTATION (OUTPATIENT)
Dept: PHYSICAL THERAPY | Facility: CLINIC | Age: 59
End: 2024-02-26
Payer: MEDICARE

## 2024-02-26 ENCOUNTER — APPOINTMENT (OUTPATIENT)
Dept: PHYSICAL THERAPY | Facility: CLINIC | Age: 59
End: 2024-02-26

## 2024-02-26 NOTE — PROGRESS NOTES
Physical Therapy                 Therapy Communication Note    Patient Name: Stephanie Castillo  MRN: [id number]  Today's Date: 2/26/2024     Discipline: Physical Therapy    Missed Visit Reason:  Patient on way to PCP appointment that conflicts with todays session.     Missed Time: Cancel    Comment:

## 2024-02-29 PROCEDURE — 2500000005 HC RX 250 GENERAL PHARMACY W/O HCPCS: Performed by: NURSE PRACTITIONER

## 2024-02-29 PROCEDURE — 20610 DRAIN/INJ JOINT/BURSA W/O US: CPT | Performed by: NURSE PRACTITIONER

## 2024-02-29 PROCEDURE — 2500000004 HC RX 250 GENERAL PHARMACY W/ HCPCS (ALT 636 FOR OP/ED): Performed by: NURSE PRACTITIONER

## 2024-02-29 RX ORDER — LIDOCAINE HYDROCHLORIDE 10 MG/ML
4 INJECTION INFILTRATION; PERINEURAL
Status: COMPLETED | OUTPATIENT
Start: 2024-02-29 | End: 2024-02-29

## 2024-02-29 RX ORDER — TRIAMCINOLONE ACETONIDE 40 MG/ML
40 INJECTION, SUSPENSION INTRA-ARTICULAR; INTRAMUSCULAR
Status: COMPLETED | OUTPATIENT
Start: 2024-02-29 | End: 2024-02-29

## 2024-02-29 RX ADMIN — TRIAMCINOLONE ACETONIDE 40 MG: 400 INJECTION, SUSPENSION INTRA-ARTICULAR; INTRAMUSCULAR at 23:24

## 2024-02-29 RX ADMIN — LIDOCAINE HYDROCHLORIDE 4 ML: 10 INJECTION, SOLUTION INFILTRATION; PERINEURAL at 23:24

## 2024-03-04 ENCOUNTER — TREATMENT (OUTPATIENT)
Dept: PHYSICAL THERAPY | Facility: CLINIC | Age: 59
End: 2024-03-04
Payer: MEDICARE

## 2024-03-04 DIAGNOSIS — M25.512 CHRONIC LEFT SHOULDER PAIN: Primary | ICD-10-CM

## 2024-03-04 DIAGNOSIS — M75.102 ROTATOR CUFF TEAR, LEFT: ICD-10-CM

## 2024-03-04 DIAGNOSIS — G89.29 CHRONIC LEFT SHOULDER PAIN: Primary | ICD-10-CM

## 2024-03-04 PROCEDURE — 97110 THERAPEUTIC EXERCISES: CPT | Mod: GP

## 2024-03-04 PROCEDURE — 97016 VASOPNEUMATIC DEVICE THERAPY: CPT | Mod: GP

## 2024-03-04 ASSESSMENT — PAIN SCALES - GENERAL: PAINLEVEL_OUTOF10: 0 - NO PAIN

## 2024-03-04 NOTE — PROGRESS NOTES
Physical Therapy Treatment    Patient Name: Stephanie Castillo  MRN: [id number]  Today's Date: 3/4/2024  Time Calculation  Start Time: 1505  Stop Time: 1545  Time Calculation (min): 40 min    Insurance:   Visit number: 2 (12 of med [facility] in 2023)  Authorization info: no auth needed  Insurance Type: Medicare/medicaid  Cert date start: 9/19/2023       Cert date end: 12/9/2023              Cert Update 12/10/2023         Cert end date 3/10/2024    Current Problem  1. Chronic left shoulder pain        2. Rotator cuff tear, left            General  Reason for Referral: RTC repair  Referred By: Turner  Precautions  Precautions  Precautions Comment: none  Pain  Pain Score: 0 - No pain    Subjective:   Subjective     Patient reports L shoulder is doing great.  Was cleared for pilates from the waist down.  Pt reports she spoke to the MD and Dr. Cr and they are disappointed that she is not being treated for 'the things that Paula worked on her with (pelvic floor and LBP)' and her right shoulder.' Shoulder pain R 10/10 today.  L 0/10 - surgical arm with current episode of care.       Arrived 20 min late    Compliant with HEP? yes    Objective:   Objective   Shoulder ROM  Flexion  125  Abd 134  ER 59  IR 63  Behind back sacrum  Supine flexion 130    UE Strength  Shoulder flexion 5/4  Shoulder abd 5/4  Biceps 5/5  Triceps 5/5  ER 5/5  IR 5/5    Treatments:   recheck  Supine shoulder flexion 2# 10 x 2 - pt stopped c/o shoulder pain  Scap protraction 2# 12 x 2 - pt stopped c/o shoulder pain  Row 2.5# 12 x 2 - stopped due to pain  Shoulder ext 2.5# 12 x 2 - stopped due to pain  PROM L shoulder: ER/IR, abduction    Assessment: patient stated she had pulmonary issues and LBP today so we were limited to supine exercises. With the strengthening activities attempted, she tended to stop midway through all exercises with c/o of pain, and ultimately requesting just for PROM.     Despite her absence from physical therapy due to  scheduling conflicts and canceled appointments, she has managed to improve ROM and strength with just limitation in rotation and mild strength deficits/endurance.        Plan: 1 time a week focus ROM strengthening for L shoulder.

## 2024-04-02 ENCOUNTER — TREATMENT (OUTPATIENT)
Dept: PHYSICAL THERAPY | Facility: CLINIC | Age: 59
End: 2024-04-02
Payer: MEDICARE

## 2024-04-02 ENCOUNTER — OFFICE VISIT (OUTPATIENT)
Dept: ORTHOPEDIC SURGERY | Facility: HOSPITAL | Age: 59
End: 2024-04-02
Payer: MEDICARE

## 2024-04-02 VITALS — HEIGHT: 64 IN | BODY MASS INDEX: 37.56 KG/M2 | WEIGHT: 220 LBS

## 2024-04-02 DIAGNOSIS — Z09 STATUS POST ORTHOPEDIC SURGERY, FOLLOW-UP EXAM: Primary | ICD-10-CM

## 2024-04-02 DIAGNOSIS — M75.102 TEAR OF LEFT ROTATOR CUFF, UNSPECIFIED TEAR EXTENT, UNSPECIFIED WHETHER TRAUMATIC: ICD-10-CM

## 2024-04-02 DIAGNOSIS — M75.102 ROTATOR CUFF TEAR, LEFT: Primary | ICD-10-CM

## 2024-04-02 DIAGNOSIS — G89.29 CHRONIC LEFT SHOULDER PAIN: ICD-10-CM

## 2024-04-02 DIAGNOSIS — M25.512 CHRONIC LEFT SHOULDER PAIN: ICD-10-CM

## 2024-04-02 PROCEDURE — 97016 VASOPNEUMATIC DEVICE THERAPY: CPT | Mod: GP,CQ | Performed by: SPECIALIST/TECHNOLOGIST

## 2024-04-02 PROCEDURE — 97112 NEUROMUSCULAR REEDUCATION: CPT | Mod: GP,CQ | Performed by: SPECIALIST/TECHNOLOGIST

## 2024-04-02 PROCEDURE — 99213 OFFICE O/P EST LOW 20 MIN: CPT | Performed by: NURSE PRACTITIONER

## 2024-04-02 PROCEDURE — 97110 THERAPEUTIC EXERCISES: CPT | Mod: GP,CQ | Performed by: SPECIALIST/TECHNOLOGIST

## 2024-04-02 ASSESSMENT — PAIN SCALES - GENERAL
PAINLEVEL_OUTOF10: 0 - NO PAIN
PAINLEVEL_OUTOF10: 2

## 2024-04-02 ASSESSMENT — PAIN - FUNCTIONAL ASSESSMENT: PAIN_FUNCTIONAL_ASSESSMENT: 0-10

## 2024-04-02 NOTE — PROGRESS NOTES
Physical Therapy Treatment    Patient Name: tSephanie Castillo  MRN: [id number]  Today's Date: 4/2/2024  Time Calculation  Start Time: 1320  Stop Time: 1410  Time Calculation (min): 50 min    Insurance:   Visit number: 2 (12 of med [facility] in 2023)  Authorization info: no auth needed  Insurance Type: Medicare/medicaid  Cert date start: 9/19/2023       Cert date end: 12/9/2023              Cert Update 12/10/2023         Cert end date 3/10/2024  Cert Update 3/11/2024 Cert end date 5/15/2024    Current Problem  1. Rotator cuff tear, left        2. Chronic left shoulder pain              General  Reason for Referral: RTC repair  Referred By: Turner Long  Precautions  Precautions Comment: none  Pain  Pain Assessment: 0-10  Pain Score: 0 - No pain    Subjective:   Subjective     Patient reports L shoulder is feeling much better with no pain on arrival.  Patient still struggles with reaching behind back and putting on jackets.  Patient also reports still feeling weak.  Patient had trip to [facility] which went well.     Compliant with HEP? yes    Objective:   Objective   Shoulder ROM  Flexion  175°    Abd 134  ER 59  IR 63  Behind back sacrum  Supine flexion 130    UE Strength  Shoulder flexion 5/4  Shoulder abd 5/4  Biceps 5/5  Triceps 5/5  ER 5/5  IR 5/5    Treatments:   Sci-fit L2 3' F/R   Pulleys hand behind back 2'   Bravo Row 5# 20x  Bravo standing ext 2.5# 20x  Bravo seated lats 20# 20x  2# flex/scap/abd to 90° 2x10   Pulleys hand back 2'   Doorframe pec stretch 1'  (add to HEP)  Standing sleeper stretch L 1' (add to HEP)  Swisswing hands, L delt, L axilla 2'   Game ready 10 min low L shoulder      Assessment: patient tolerated intensity with fatigue breaking sweat with exercise.  Patient noted feeling good post treatment.          Plan: Continue to improve ROM, anterior flexibility, prime  strength and scapular stability to improve posture and ADL.  Patient follows up with MD today at 3 PM.

## 2024-04-02 NOTE — PROGRESS NOTES
Provider Impression/Assessment:  Stephanie Castillo is 7.5 months status post LEFT arthroscopic rotator cuff repair. Right shoulder pain consistent with rotator cuff tendinitis and biceps tendinitis, completed on 8/25/23.     Patient Discussion/Plan:  Stephanie Castillo will now continue to work on strengthening with physical therapy at [facility] now that her range of motion has nicely improved. A new prescription for therapy was given to the patient today. They can start to lift up to 10 pounds as tolerated with therapy. Encouraged her to continue to focus on range of motion and scapular stabilizers daily at home. Continue to use caution with overhead lift.     Scheduled to be seen back in clinic in 6 weeks for a repeat clinical check and likely progression of more strengthening activities. They understand that it can take up through a full year to get the full benefit from surgery.     Patient was discussed with Dr Tompkins and he agrees with the above plan.    Should you have any questions or concerns after your visit today, please do not hesitate to call the office at [phone]. We strive to give you high-quality, patient-centered, collaborative care and I am honored to be a part of your health care team.    --------------------------------------------------------------------------------------------------------  CHIEF COMPLAINT:  POV: LEFT RCR  DOS: 8/25/23  DOLI RIGHT SHOULDER, 2/20/24     History of Present Illness:  STEPHANIE CASTILLO is a pleasant 58 year old female who returns to clinic for her first postoperative visit. She is status post left arthroscopic rotator cuff repair and arthroscopic biceps tenodesis on 8/25/23.      She reports doing well after surgery. Pain is well managed during the day, worse at night. She continues to use narcotic pain medication as needed. Alternating with Over-the-counter pain medication. Using ice daily. She is not sleeping without difficulty. She denies redness  "or warmth at incision site. Denies any fever, chills, or paresthesia. There is some intermittent pain that waxes and wanes between moderate and mild severity. She has been very compliant with restrictions. Presents today wearing her sling. Doing well with daily home therapy for elbow, wrist and hand. She is requesting additional pain medication today.     2/20/24 - STEPHANIE ZARCO returns to clinic for bilateral shoulder pain. She is 6 months S/P Left Shoulder Arthroscopic Decompression; Debridement; Rotator Cuff Repair and Biceps Tenodesis, done 08/25/23. Stephanie is still experiencing pain in her left shoulder and states that she is struggling to schedule physical therapy at [facility]. She would like to do therapy at another location. Stephanie reports right shoulder pain, as well. XR today. She is requesting a CSI today. She believes that this pain is stemming from \"over working\" her right shoulder at physical therapy. Stephanie denies any new injury or trauma to either shoulder.     4/02/24 - STEPHANIE ZARCO returns to clinic for bilateral shoulder pain. She is 7.5 months S/P Left Shoulder Arthroscopic Decompression; Debridement; Rotator Cuff Repair and Biceps Tenodesis, done 08/25/23. Stephanie states that her most recent right shoulder CSI provided significant pain relief. Physical therapy is going well. Stephanie reports significant improvement in both shoulders. No issues or concerns to report.     Review of Systems:   Review of systems for all 14 systems is negative for complaint except for the above noted       findings in the history of present illness.    Allergies:  Allergies   Allergen Reactions    Amoxicillin-Pot Clavulanate Unknown    Bee Pollen Unknown    Cat Dander Unknown    Dog Dander Unknown    Grass Pollen Unknown    Other Unknown     seasonal    Ragweed Pollen Unknown       Medications:    Current Outpatient Medications:     albuterol 90 mcg/actuation inhaler, Inhale., Disp: , Rfl:     azelastine (Astelin) 137 mcg " (0.1 %) nasal spray, Administer into affected nostril(s)., Disp: , Rfl:     cycloSPORINE (Restasis MultiDose) 0.05 % drops, Administer into affected eye(s) every 12 hours., Disp: , Rfl:     diphenoxylate-atropine (Lomotil) 2.5-0.025 mg tablet, Take 1 tablet by mouth 2 times a day as needed for diarrhea., Disp: 30 tablet, Rfl: 0    doxycycline (Vibra-Tabs) 100 mg tablet, 1 tablet (100 mg)., Disp: , Rfl:     DULoxetine (Cymbalta) 30 mg DR capsule, Take 1 capsule (30 mg) by mouth once daily., Disp: , Rfl:     ferrous gluconate 324 (38 Fe) mg tablet, Take 1 tablet (324 mg) by mouth once daily with breakfast., Disp: , Rfl:     fluocinonide 0.05 % cream, Apply 1 Application topically., Disp: , Rfl:     fluticasone (Flonase) 50 mcg/actuation nasal spray, , Disp: , Rfl:     furosemide (Lasix) 20 mg tablet, Take 1 tablet (20 mg) by mouth once daily. (Patient not taking: Reported on 1/29/2024), Disp: 90 tablet, Rfl: 0    gabapentin (Neurontin) 300 mg capsule, Take 1 capsule (300 mg) by mouth 2 times a day., Disp: , Rfl:     levocetirizine (Xyzal) 5 mg tablet, Take 1 tablet (5 mg) by mouth once daily., Disp: 90 tablet, Rfl: 1    levothyroxine (Synthroid, Levoxyl) 75 mcg tablet, take 1 tablet by mouth once daily, Disp: 90 tablet, Rfl: 3    ondansetron (Zofran) 4 mg tablet, Take 1 tablet (4 mg) by mouth every 8 hours if needed for nausea or vomiting., Disp: 30 tablet, Rfl: 1    valACYclovir (Valtrex) 500 mg tablet, Take 1 tablet (500 mg) by mouth once daily., Disp: 90 tablet, Rfl: 3    Diagnoses/Problems:  Left rotator cuff tear  Right shoulder tendinitis    Physical Examination:  Stephanie Castillo is a well-developed well-nourished female in no acute distress. Breathes with normal chest rises. Eye exam reveals round pupils. Awake alert and oriented x3.     Examination of right shoulder reveals range of motion 0-160° of forward elevation. 0-60° of external rotation. Internal rotation was to T12. Mild pain with palpation  over bicipital groove. Skin is intact. No edema. No ecchymosis. No erythema. Jobes test is 5 out of 5. Positive Neers test. Positive Camarena sign.     Examination of left shoulder reveals incisions are dry, intact and well healed. No swelling. No warmth or erythema. No ecchymosis. Neurovascularly intact distally. 5 out of 5 wrist, hand and thumb flexion and extension without pain. Full active range of motion of elbow. They can actively elevate to about 160° of forward elevation. External rotations of 50°. Internal rotation to L3, passively to T12.  Jobes testing 4/5  Internal resistance 5/5. External resistance 5/5    Results/Imaging:  X-ray of RIGHT shoulder shows no signs of any fracture, dislocation or arthritis. Mild degenerative changes without calcium deposits, or tumors. Radiology results discussed with Dr. Tompkins.       *While intending to generate a timely document that accurately reflects the content of the visit, no guarantee can be provided that every grammatical or spelling mistake has been or will be identified or corrected. Thank you for your understanding.

## 2024-04-10 ENCOUNTER — APPOINTMENT (OUTPATIENT)
Dept: PHYSICAL THERAPY | Facility: CLINIC | Age: 59
End: 2024-04-10
Payer: MEDICARE
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
## 2024-04-14 NOTE — PATIENT INSTRUCTIONS
-Ice on and off for the next 24 hours if injection sites are sore. Do gentle range of motion exercises. Try to do them every hour for about half a minute or so, in every direction that the affected part goes. Avoid heavy lifting for the next 2 days.   -Ask your psychiatrist to consider going up on Cymbalta 60 mg  -consider gabapentin again, Lyrica in the future   -Continue Jhonatan chi, pilates, home exercises, PT. I will reach out to Janet  -Continue with acupuncture as needed  -Sleep study ordered again  -Referral to PCP provided, look into Dr. Olsen, Dr. Vásquez  -Follow up with Broderick Horton to discuss L5-S1 epidural steroid injections if you want  -OMT ordered previously: Dr. Carlos [phone] [place], [facility] OMT clinic [phone]  -Follow up as needed, you can message me on my chart

## 2024-04-15 ENCOUNTER — APPOINTMENT (OUTPATIENT)
Dept: PHYSICAL THERAPY | Facility: CLINIC | Age: 59
End: 2024-04-15
Payer: MEDICARE

## 2024-04-15 ENCOUNTER — OFFICE VISIT (OUTPATIENT)
Dept: PHYSICAL MEDICINE AND REHAB | Facility: CLINIC | Age: 59
End: 2024-04-15
Payer: MEDICARE

## 2024-04-15 VITALS
WEIGHT: 231 LBS | HEIGHT: 65 IN | BODY MASS INDEX: 38.49 KG/M2 | DIASTOLIC BLOOD PRESSURE: 70 MMHG | TEMPERATURE: 97.6 F | RESPIRATION RATE: 18 BRPM | HEART RATE: 90 BPM | SYSTOLIC BLOOD PRESSURE: 100 MMHG

## 2024-04-15 DIAGNOSIS — M79.7 FIBROMYALGIA: ICD-10-CM

## 2024-04-15 DIAGNOSIS — M54.6 PAIN IN THORACIC SPINE: ICD-10-CM

## 2024-04-15 DIAGNOSIS — M53.3 SACROILIAC JOINT DYSFUNCTION OF BOTH SIDES: ICD-10-CM

## 2024-04-15 DIAGNOSIS — M25.511 CHRONIC PAIN OF BOTH SHOULDERS: ICD-10-CM

## 2024-04-15 DIAGNOSIS — G89.29 CHRONIC PAIN OF RIGHT KNEE: ICD-10-CM

## 2024-04-15 DIAGNOSIS — M54.50 CHRONIC BILATERAL LOW BACK PAIN WITHOUT SCIATICA: ICD-10-CM

## 2024-04-15 DIAGNOSIS — R60.1 GENERALIZED EDEMA: Primary | ICD-10-CM

## 2024-04-15 DIAGNOSIS — M25.561 CHRONIC PAIN OF RIGHT KNEE: ICD-10-CM

## 2024-04-15 DIAGNOSIS — M54.16 LUMBAR RADICULITIS: ICD-10-CM

## 2024-04-15 DIAGNOSIS — G89.29 CHRONIC PAIN OF BOTH SHOULDERS: ICD-10-CM

## 2024-04-15 DIAGNOSIS — G89.29 CHRONIC NECK PAIN: ICD-10-CM

## 2024-04-15 DIAGNOSIS — G47.33 OBSTRUCTIVE SLEEP APNEA: ICD-10-CM

## 2024-04-15 DIAGNOSIS — M79.18 MYOFASCIAL PAIN SYNDROME: ICD-10-CM

## 2024-04-15 DIAGNOSIS — M25.859 FEMOROACETABULAR IMPINGEMENT: ICD-10-CM

## 2024-04-15 DIAGNOSIS — G89.29 CHRONIC BILATERAL LOW BACK PAIN WITHOUT SCIATICA: ICD-10-CM

## 2024-04-15 DIAGNOSIS — M25.512 CHRONIC PAIN OF BOTH SHOULDERS: ICD-10-CM

## 2024-04-15 DIAGNOSIS — M54.2 CHRONIC NECK PAIN: ICD-10-CM

## 2024-04-15 PROCEDURE — 99214 OFFICE O/P EST MOD 30 MIN: CPT | Performed by: PHYSICAL MEDICINE & REHABILITATION

## 2024-04-15 PROCEDURE — 20553 NJX 1/MLT TRIGGER POINTS 3/>: CPT | Performed by: PHYSICAL MEDICINE & REHABILITATION

## 2024-04-15 ASSESSMENT — PAIN SCALES - GENERAL: PAINLEVEL: 8

## 2024-04-17 ENCOUNTER — TELEPHONE (OUTPATIENT)
Dept: PHYSICAL MEDICINE AND REHAB | Facility: CLINIC | Age: 59
End: 2024-04-17
Payer: MEDICARE

## 2024-04-17 ENCOUNTER — APPOINTMENT (OUTPATIENT)
Dept: PHYSICAL THERAPY | Facility: CLINIC | Age: 59
End: 2024-04-17
Payer: MEDICARE

## 2024-04-17 NOTE — TELEPHONE ENCOUNTER
Pt calling regarding edema- she called the PCP doctors you recommended and they cannot see her- Dr. Troy does not accept medicare/medicaid for new patients and dr. Mcgrath is booking until June.  Is there someone else you would recommend or should she go to the ER?

## 2024-04-18 ENCOUNTER — HOSPITAL ENCOUNTER (OUTPATIENT)
Dept: RADIOLOGY | Facility: HOSPITAL | Age: 59
Discharge: HOME | End: 2024-04-18
Payer: MEDICARE

## 2024-04-18 ENCOUNTER — HOSPITAL ENCOUNTER (OUTPATIENT)
Dept: RADIOLOGY | Facility: CLINIC | Age: 59
Discharge: HOME | End: 2024-04-18
Payer: MEDICARE

## 2024-04-18 ENCOUNTER — OFFICE VISIT (OUTPATIENT)
Dept: PAIN MEDICINE | Facility: HOSPITAL | Age: 59
End: 2024-04-18
Payer: MEDICARE

## 2024-04-18 DIAGNOSIS — G89.29 CHRONIC BILATERAL LOW BACK PAIN, UNSPECIFIED WHETHER SCIATICA PRESENT: ICD-10-CM

## 2024-04-18 DIAGNOSIS — M54.50 CHRONIC BILATERAL LOW BACK PAIN, UNSPECIFIED WHETHER SCIATICA PRESENT: ICD-10-CM

## 2024-04-18 PROCEDURE — 72120 X-RAY BEND ONLY L-S SPINE: CPT

## 2024-04-18 PROCEDURE — 99214 OFFICE O/P EST MOD 30 MIN: CPT | Performed by: ANESTHESIOLOGY

## 2024-04-18 PROCEDURE — 99204 OFFICE O/P NEW MOD 45 MIN: CPT | Performed by: ANESTHESIOLOGY

## 2024-04-18 PROCEDURE — 72110 X-RAY EXAM L-2 SPINE 4/>VWS: CPT | Performed by: RADIOLOGY

## 2024-04-18 ASSESSMENT — PAIN SCALES - GENERAL: PAINLEVEL: 7
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
## 2024-05-01 ENCOUNTER — TREATMENT (OUTPATIENT)
Dept: PHYSICAL THERAPY | Facility: CLINIC | Age: 59
End: 2024-05-01
Payer: MEDICARE

## 2024-05-01 DIAGNOSIS — M75.102 ROTATOR CUFF TEAR, LEFT: ICD-10-CM

## 2024-05-01 DIAGNOSIS — G89.29 CHRONIC PAIN OF BOTH SHOULDERS: ICD-10-CM

## 2024-05-01 DIAGNOSIS — M25.512 CHRONIC PAIN OF BOTH SHOULDERS: ICD-10-CM

## 2024-05-01 DIAGNOSIS — M25.511 CHRONIC PAIN OF BOTH SHOULDERS: ICD-10-CM

## 2024-05-01 PROCEDURE — 97016 VASOPNEUMATIC DEVICE THERAPY: CPT | Mod: GP,CQ | Performed by: SPECIALIST/TECHNOLOGIST

## 2024-05-01 PROCEDURE — 97110 THERAPEUTIC EXERCISES: CPT | Mod: GP,CQ | Performed by: SPECIALIST/TECHNOLOGIST

## 2024-05-01 PROCEDURE — 97140 MANUAL THERAPY 1/> REGIONS: CPT | Mod: GP,CQ | Performed by: SPECIALIST/TECHNOLOGIST

## 2024-05-01 ASSESSMENT — PAIN - FUNCTIONAL ASSESSMENT: PAIN_FUNCTIONAL_ASSESSMENT: 0-10

## 2024-05-01 ASSESSMENT — PAIN SCALES - GENERAL: PAINLEVEL_OUTOF10: 7

## 2024-05-01 NOTE — PROGRESS NOTES
Physical Therapy Treatment    Patient Name: Stephanie Castillo  MRN: [id number]  Today's Date: 5/1/2024  Time Calculation  Start Time: 1415  Stop Time: 1515  Time Calculation (min): 60 min    Insurance:   Visit number: 3 (12 of med [facility] in 2023)  Authorization info: no auth needed  Insurance Type: Medicare/medicaid  Cert date start: 9/19/2023       Cert date end: 12/9/2023              Cert Update 12/10/2023         Cert end date 3/10/2024  Cert Update 3/11/2024 Cert end date 5/15/2024    Current Problem  1. Chronic pain of both shoulders  Follow Up In Physical Therapy      2. Rotator cuff tear, left  Follow Up In Physical Therapy            General  Reason for Referral: RTC repair  Referred By: Turner Long  Precautions  Precautions Comment: none  Pain  Pain Assessment: 0-10  Pain Score: 7  Pain Location: Shoulder  Pain Orientation: Left    Subjective:   Subjective     Patient reports L shoulder has been painful and still has problems putting on clothes.  Patient notes recent bout of IBS and has PCP follow-up tomorrow.  Patient notes recent increase in back pain related to less exercise due to IBS.     Compliant with HEP? yes    Objective:   Objective   Shoulder ROM  Flexion  175°    Abd 134  ER 59  IR 63  Behind back sacrum  Supine flexion 130    UE Strength  Shoulder flexion 5/4  Shoulder abd 5/4  Biceps 5/5  Triceps 5/5  ER 5/5  IR 5/5    Treatments:   Sci-fit L2 3' F/R   Pulleys hand behind back 2'   Iso int rot/ext rot 10x5s  Bravo Row 5# 20x  Bravo standing ext 2.5# 20x  Bravo seated lats 20# 20x  2# flex/scap/abd to 90° 2x10   Supine stretch Pecs 1'   Supine Int rot stretch R/L 1' Ext rot L 1'   Shoulder mobs L shoulder inf/post (improve I/E rot post mobilization)   Swisswing hands, L delt, L axilla 2'   Game ready 15 min low L shoulder      Assessment: patient tolerated intensity noting ease of strengthening feeling ready to increase weight on Bravo work.   Patient noted feeling good post  treatment.      Plan: Continue to improve ROM, anterior flexibility, prime  strength and scapular stability to improve posture and ADL.

## 2024-05-06 ENCOUNTER — APPOINTMENT (OUTPATIENT)
Dept: RADIOLOGY | Facility: HOSPITAL | Age: 59
End: 2024-05-06
Payer: MEDICARE

## 2024-05-08 ENCOUNTER — APPOINTMENT (OUTPATIENT)
Dept: PHYSICAL THERAPY | Facility: CLINIC | Age: 59
End: 2024-05-08
Payer: MEDICARE

## 2024-05-11 NOTE — PROGRESS NOTES
Subjective    Patient ID: Stephanie Castillo is a 59 y.o. female.    Chief Complaint: Bilateral shoulder pain R > L     Last Surgery: Left arthroscopic rotator cuff repair with arthroscopic biceps tenodesis.   Last Surgery Date: 08/25/23    HPI  She continues to struggle a little bit with her function.  Her other shoulder is also bothering her.  She is sleeping a little bit better.  She is early in the recovery.  She is doing therapy and seems to think that that is helping.    05/14/24  Stephanie returns to the clinic today for a repeat follow up visit regarding her bilateral shoulders.    She reports she still struggles with her left shoulder. Putting on her bra is very difficult.     Objective   Patient is a well-developed, well-nourished female in no acute distress.  Breathes with normal chest rises.  Pupils are round and symmetric today.  Awake, alert, and oriented x3.      Examination of the right shoulder today reveals the skin to be intact. There is no sign of any atrophy, lesions, or abrasions. There is no pain to palpation of the bony prominences. Cervical lymphadenopathy examined, and this was negative. Patient had 5 out of 5 wrist flexion, extension, and thumb extension, bilaterally. Sensation was intact to light touch to median, ulnar, radial, axillary, and musculocutaneous nerves bilaterally. Positive radial pulse bilaterally.   Range of motion of the right shoulder revealed 0-170° of forward elevation, 0-60° of external rotation, and internal rotation up to T-12.  Positive Camarena sign positive Neer sign    Examination of the left shoulder reveals well healed surgical incisions. Minimal swelling. No warmth or erythema. No ecchymosis. Sutures were removed today and steris strips placed on incision sites on top of shoulder. Neurovascularly intact distally. 5 out of 5 wrist, hand and thumb flexion and extension without pain. Full active range of motion of elbow.  50 degrees of forward elevation 13  degrees passively of forward elevation. 20degrees external rotation. 3/5 Luan's testing    Image Results:    Patient ID: Stephanie Gallardo is a 59 y.o. female.    L Inj/Asp: L subacromial bursa on 5/14/2024 3:47 PM  Indications: pain  Details: 20 G needle, anterior approach  Medications: 40 mg triamcinolone acetonide 40 mg/mL; 4 mL lidocaine 10 mg/mL (1 %)  Outcome: tolerated well, no immediate complications  Procedure, treatment alternatives, risks and benefits explained, specific risks discussed. Consent was given by the patient. Immediately prior to procedure a time out was called to verify the correct patient, procedure, equipment, support staff and site/side marked as required. Patient was prepped and draped in the usual sterile fashion.       L Inj/Asp: R subacromial bursa on 5/14/2024 3:48 PM  Indications: pain  Details: 20 G needle, anterior approach  Medications: 40 mg triamcinolone acetonide 40 mg/mL; 4 mL lidocaine 10 mg/mL (1 %)  Outcome: tolerated well, no immediate complications  Procedure, treatment alternatives, risks and benefits explained, specific risks discussed. Consent was given by the patient. Immediately prior to procedure a time out was called to verify the correct patient, procedure, equipment, support staff and site/side marked as required. Patient was prepped and draped in the usual sterile fashion.           Assessment/Plan   Encounter Diagnoses:  No diagnosis found.  STEPHANIE is 9 months status post left arthroscopic rotator cuff repair, decompression, debridement and arthroscopic biceps tenodesis on 8/25/23.     We did bilateral shoulder injections for her today as she is in pain. We updated her physical therapy script. We are going to see her back in 3 month to see how she is doing.     No orders of the defined types were placed in this encounter.    Follow up in 3 month    Scribe Attestation  By signing my name below, ILinda Scribe   attest that this documentation  has been prepared under the direction and in the presence of Octavio Tompkins MD.

## 2024-05-14 ENCOUNTER — OFFICE VISIT (OUTPATIENT)
Dept: ORTHOPEDIC SURGERY | Facility: HOSPITAL | Age: 59
End: 2024-05-14
Payer: MEDICARE

## 2024-05-14 ENCOUNTER — APPOINTMENT (OUTPATIENT)
Dept: ORTHOPEDIC SURGERY | Facility: HOSPITAL | Age: 59
End: 2024-05-14
Payer: MEDICARE

## 2024-05-14 DIAGNOSIS — M75.102 TEAR OF LEFT ROTATOR CUFF, UNSPECIFIED TEAR EXTENT, UNSPECIFIED WHETHER TRAUMATIC: ICD-10-CM

## 2024-05-14 DIAGNOSIS — M25.511 RIGHT SHOULDER PAIN, UNSPECIFIED CHRONICITY: Primary | ICD-10-CM

## 2024-05-14 PROCEDURE — 99213 OFFICE O/P EST LOW 20 MIN: CPT | Performed by: ORTHOPAEDIC SURGERY

## 2024-05-14 PROCEDURE — 2500000004 HC RX 250 GENERAL PHARMACY W/ HCPCS (ALT 636 FOR OP/ED): Performed by: ORTHOPAEDIC SURGERY

## 2024-05-14 PROCEDURE — 20610 DRAIN/INJ JOINT/BURSA W/O US: CPT | Mod: RT | Performed by: ORTHOPAEDIC SURGERY

## 2024-05-14 PROCEDURE — 20610 DRAIN/INJ JOINT/BURSA W/O US: CPT | Mod: LT | Performed by: ORTHOPAEDIC SURGERY

## 2024-05-14 PROCEDURE — 2500000005 HC RX 250 GENERAL PHARMACY W/O HCPCS: Performed by: ORTHOPAEDIC SURGERY

## 2024-05-14 RX ADMIN — LIDOCAINE HYDROCHLORIDE 4 ML: 10 INJECTION, SOLUTION INFILTRATION; PERINEURAL at 15:47

## 2024-05-14 RX ADMIN — TRIAMCINOLONE ACETONIDE 40 MG: 400 INJECTION, SUSPENSION INTRA-ARTICULAR; INTRAMUSCULAR at 15:48

## 2024-05-14 RX ADMIN — TRIAMCINOLONE ACETONIDE 40 MG: 400 INJECTION, SUSPENSION INTRA-ARTICULAR; INTRAMUSCULAR at 15:47

## 2024-05-14 RX ADMIN — LIDOCAINE HYDROCHLORIDE 4 ML: 10 INJECTION, SOLUTION INFILTRATION; PERINEURAL at 15:48

## 2024-05-15 ENCOUNTER — APPOINTMENT (OUTPATIENT)
Dept: PHYSICAL THERAPY | Facility: CLINIC | Age: 59
End: 2024-05-15
Payer: MEDICARE

## 2024-05-15 ENCOUNTER — DOCUMENTATION (OUTPATIENT)
Dept: PHYSICAL THERAPY | Facility: CLINIC | Age: 59
End: 2024-05-15
Payer: MEDICARE

## 2024-05-15 NOTE — PROGRESS NOTES
Discharge Summary    Name: Stephanie Gallardo  MRN: [id number]  : 1965  Date: 05/15/24    Discharge Summary: PT    Discharge Information: Date of discharge 5/15/24, Date of last visit 24, Date of evaluation 23, Number of attended visits 15, and Referred for L shoulder    Therapy Summary: RTC repair    Discharge Status: unknown     Rehab Discharge Reason: Attendance inconsistent, Failed to keep follow-up appointment(s), and other  :  PT had 13 cancels during her time spent in PT which prolonged the duration of her PT POC due to same day cancels and inability to schedule in a timely manner.  Also her behavior and attitude toward the  workers and her clinical supportive staff was deemed incompatible and recommended termination of services from our department.

## 2024-05-22 RX ORDER — TRIAMCINOLONE ACETONIDE 40 MG/ML
40 INJECTION, SUSPENSION INTRA-ARTICULAR; INTRAMUSCULAR
Status: COMPLETED | OUTPATIENT
Start: 2024-05-14 | End: 2024-05-14

## 2024-05-22 RX ORDER — LIDOCAINE HYDROCHLORIDE 10 MG/ML
4 INJECTION INFILTRATION; PERINEURAL
Status: COMPLETED | OUTPATIENT
Start: 2024-05-14 | End: 2024-05-14

## 2024-06-24 ENCOUNTER — TELEPHONE (OUTPATIENT)
Dept: OBSTETRICS AND GYNECOLOGY | Facility: CLINIC | Age: 59
End: 2024-06-24

## 2024-06-24 ENCOUNTER — APPOINTMENT (OUTPATIENT)
Dept: OBSTETRICS AND GYNECOLOGY | Facility: CLINIC | Age: 59
End: 2024-06-24
Payer: MEDICARE

## 2024-06-24 VITALS
WEIGHT: 226 LBS | HEIGHT: 66 IN | SYSTOLIC BLOOD PRESSURE: 132 MMHG | DIASTOLIC BLOOD PRESSURE: 72 MMHG | BODY MASS INDEX: 36.32 KG/M2

## 2024-06-24 DIAGNOSIS — Z01.419 ENCOUNTER FOR ANNUAL ROUTINE GYNECOLOGICAL EXAMINATION: ICD-10-CM

## 2024-06-24 DIAGNOSIS — Z12.31 VISIT FOR SCREENING MAMMOGRAM: ICD-10-CM

## 2024-06-24 PROCEDURE — 88175 CYTOPATH C/V AUTO FLUID REDO: CPT

## 2024-06-24 PROCEDURE — 87624 HPV HI-RISK TYP POOLED RSLT: CPT

## 2024-06-24 PROCEDURE — 99396 PREV VISIT EST AGE 40-64: CPT | Performed by: OBSTETRICS & GYNECOLOGY

## 2024-06-24 RX ORDER — CYCLOSPORINE 0.5 MG/ML
1 EMULSION OPHTHALMIC EVERY 12 HOURS
COMMUNITY
Start: 2024-05-03

## 2024-06-24 RX ORDER — ASPIRIN 81 MG/1
81 TABLET ORAL
COMMUNITY
Start: 2023-08-24 | End: 2024-08-23

## 2024-06-24 ASSESSMENT — ENCOUNTER SYMPTOMS
CARDIOVASCULAR NEGATIVE: 0
MUSCULOSKELETAL NEGATIVE: 0
HEMATOLOGIC/LYMPHATIC NEGATIVE: 0
CONSTITUTIONAL NEGATIVE: 0
NEUROLOGICAL NEGATIVE: 0
EYES NEGATIVE: 0
RESPIRATORY NEGATIVE: 0
ENDOCRINE NEGATIVE: 0
PSYCHIATRIC NEGATIVE: 0
ALLERGIC/IMMUNOLOGIC NEGATIVE: 0
GASTROINTESTINAL NEGATIVE: 0

## 2024-06-24 ASSESSMENT — PAIN SCALES - GENERAL: PAINLEVEL: 0-NO PAIN

## 2024-06-24 NOTE — PROGRESS NOTES
Subjective   Patient ID: Stephanie Gallardo is a 59 y.o. female who presents for Annual Exam.  Pt has no complaints today  Pt feels well   She needs a PAP and mammogram.      Review of Systems   All other systems reviewed and are negative.      Objective   Physical Exam  Constitutional:       Appearance: Normal appearance. She is obese.   Pulmonary:      Effort: Pulmonary effort is normal.   Genitourinary:     General: Normal vulva.      Vagina: Normal.      Cervix: Normal.      Uterus: Normal.       Adnexa: Right adnexa normal and left adnexa normal.      Rectum: Normal.   Musculoskeletal:      Cervical back: Neck supple.   Skin:     General: Skin is warm.   Neurological:      Mental Status: She is alert.   Psychiatric:         Attention and Perception: Attention normal.         Mood and Affect: Mood normal.         Behavior: Behavior normal.         Assessment/Plan   Diagnoses and all orders for this visit:  Encounter for annual routine gynecological examination  -     THINPREP PAP  Visit for screening mammogram  -     BI mammo bilateral screening tomosynthesis; Future    Follow up next year        Monique Chadwick MD 06/24/24 3:34 PM

## 2024-06-25 DIAGNOSIS — R21 RASH AND NONSPECIFIC SKIN ERUPTION: ICD-10-CM

## 2024-06-25 DIAGNOSIS — B00.9 HSV INFECTION: ICD-10-CM

## 2024-06-25 DIAGNOSIS — B37.2 YEAST DERMATITIS: ICD-10-CM

## 2024-06-25 DIAGNOSIS — B00.9 HERPES SIMPLEX VIRUS (HSV) INFECTION: ICD-10-CM

## 2024-06-25 RX ORDER — NYSTATIN 100000 [USP'U]/G
1 POWDER TOPICAL 3 TIMES DAILY
Qty: 45 G | Refills: 2 | Status: SHIPPED | OUTPATIENT
Start: 2024-06-25 | End: 2024-09-23

## 2024-06-25 RX ORDER — NYSTATIN 100000 [USP'U]/G
1 POWDER TOPICAL 3 TIMES DAILY
Qty: 15 G | Refills: 0 | Status: CANCELLED | OUTPATIENT
Start: 2024-06-25 | End: 2025-06-25

## 2024-06-25 RX ORDER — LIDOCAINE 50 MG/G
OINTMENT TOPICAL AS NEEDED
Qty: 30 G | Refills: 0 | Status: SHIPPED | OUTPATIENT
Start: 2024-06-25 | End: 2025-06-25

## 2024-06-25 RX ORDER — LIDOCAINE 40 MG/G
CREAM TOPICAL ONCE
Status: CANCELLED | OUTPATIENT
Start: 2024-06-25 | End: 2024-06-25

## 2024-06-26 ENCOUNTER — HOSPITAL ENCOUNTER (OUTPATIENT)
Dept: RADIOLOGY | Facility: CLINIC | Age: 59
Discharge: HOME | End: 2024-06-26
Payer: MEDICARE

## 2024-06-26 ENCOUNTER — DOCUMENTATION (OUTPATIENT)
Dept: OBSTETRICS AND GYNECOLOGY | Facility: CLINIC | Age: 59
End: 2024-06-26
Payer: MEDICARE

## 2024-06-26 ENCOUNTER — APPOINTMENT (OUTPATIENT)
Dept: RADIOLOGY | Facility: CLINIC | Age: 59
End: 2024-06-26
Payer: MEDICARE

## 2024-06-26 VITALS — WEIGHT: 227.07 LBS | HEIGHT: 66 IN | BODY MASS INDEX: 36.49 KG/M2

## 2024-06-26 DIAGNOSIS — Z12.31 VISIT FOR SCREENING MAMMOGRAM: ICD-10-CM

## 2024-06-26 PROCEDURE — 77063 BREAST TOMOSYNTHESIS BI: CPT | Performed by: RADIOLOGY

## 2024-06-26 PROCEDURE — 77067 SCR MAMMO BI INCL CAD: CPT | Performed by: RADIOLOGY

## 2024-06-26 PROCEDURE — 77067 SCR MAMMO BI INCL CAD: CPT

## 2024-06-26 NOTE — PROGRESS NOTES
Made pt aware rx was denied for lidocaine PA she could pay out of pocket or find an alternate medication over the counter. pt verbalized understanding  no further questions or concerns at this time

## 2024-07-02 LAB
CYTOLOGY CMNT CVX/VAG CYTO-IMP: NORMAL
HPV HR 12 DNA GENITAL QL NAA+PROBE: NEGATIVE
HPV HR GENOTYPES PNL CVX NAA+PROBE: NEGATIVE
HPV16 DNA SPEC QL NAA+PROBE: NEGATIVE
HPV18 DNA SPEC QL NAA+PROBE: NEGATIVE
LAB AP HPV GENOTYPE QUESTION: YES
LAB AP HPV HR: NORMAL
LABORATORY COMMENT REPORT: NORMAL
PATH REPORT.TOTAL CANCER: NORMAL

## 2024-07-15 ENCOUNTER — APPOINTMENT (OUTPATIENT)
Dept: PHYSICAL MEDICINE AND REHAB | Facility: CLINIC | Age: 59
End: 2024-07-15
Payer: MEDICARE

## 2024-07-22 ENCOUNTER — APPOINTMENT (OUTPATIENT)
Dept: PHYSICAL MEDICINE AND REHAB | Facility: CLINIC | Age: 59
End: 2024-07-22
Payer: MEDICARE
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
## 2024-07-23 ENCOUNTER — APPOINTMENT (OUTPATIENT)
Dept: ORTHOPEDIC SURGERY | Facility: HOSPITAL | Age: 59
End: 2024-07-23
Payer: MEDICARE
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
## 2024-07-25 ENCOUNTER — APPOINTMENT (OUTPATIENT)
Dept: PRIMARY CARE | Facility: CLINIC | Age: 59
End: 2024-07-25
Payer: MEDICARE

## 2024-07-25 ENCOUNTER — LAB (OUTPATIENT)
Dept: LAB | Facility: LAB | Age: 59
End: 2024-07-25
Payer: COMMERCIAL

## 2024-07-25 VITALS
OXYGEN SATURATION: 96 % | BODY MASS INDEX: 35.36 KG/M2 | HEART RATE: 78 BPM | HEIGHT: 66 IN | WEIGHT: 220 LBS | SYSTOLIC BLOOD PRESSURE: 100 MMHG | DIASTOLIC BLOOD PRESSURE: 67 MMHG

## 2024-07-25 DIAGNOSIS — R73.9 ELEVATED BLOOD SUGAR: ICD-10-CM

## 2024-07-25 DIAGNOSIS — Z00.00 MEDICARE ANNUAL WELLNESS VISIT, SUBSEQUENT: Primary | ICD-10-CM

## 2024-07-25 DIAGNOSIS — R42 POSTURAL DIZZINESS WITH PRESYNCOPE: ICD-10-CM

## 2024-07-25 DIAGNOSIS — R94.6 ABNORMAL THYROID EXAM: ICD-10-CM

## 2024-07-25 DIAGNOSIS — E78.2 MODERATE MIXED HYPERLIPIDEMIA NOT REQUIRING STATIN THERAPY: ICD-10-CM

## 2024-07-25 DIAGNOSIS — E55.9 VITAMIN D DEFICIENCY: ICD-10-CM

## 2024-07-25 DIAGNOSIS — G47.33 OBSTRUCTIVE SLEEP APNEA: ICD-10-CM

## 2024-07-25 DIAGNOSIS — J06.9 VIRAL UPPER RESPIRATORY TRACT INFECTION: ICD-10-CM

## 2024-07-25 DIAGNOSIS — Z13.6 SCREENING FOR CARDIOVASCULAR CONDITION: ICD-10-CM

## 2024-07-25 DIAGNOSIS — D50.8 OTHER IRON DEFICIENCY ANEMIA: ICD-10-CM

## 2024-07-25 DIAGNOSIS — Z11.59 NEED FOR HEPATITIS C SCREENING TEST: ICD-10-CM

## 2024-07-25 DIAGNOSIS — Z11.4 SCREENING FOR HIV WITHOUT PRESENCE OF RISK FACTORS: ICD-10-CM

## 2024-07-25 DIAGNOSIS — E66.01 CLASS 2 SEVERE OBESITY DUE TO EXCESS CALORIES WITH SERIOUS COMORBIDITY AND BODY MASS INDEX (BMI) OF 35.0 TO 35.9 IN ADULT (MULTI): ICD-10-CM

## 2024-07-25 DIAGNOSIS — R55 POSTURAL DIZZINESS WITH PRESYNCOPE: ICD-10-CM

## 2024-07-25 DIAGNOSIS — U07.1 COVID-19 VIRUS INFECTION: ICD-10-CM

## 2024-07-25 PROBLEM — R63.0 POOR APPETITE: Status: RESOLVED | Noted: 2023-06-22 | Resolved: 2024-07-25

## 2024-07-25 PROBLEM — H52.223 REGULAR ASTIGMATISM OF BOTH EYES: Status: RESOLVED | Noted: 2023-09-27 | Resolved: 2024-07-25

## 2024-07-25 PROBLEM — R51.9 HEADACHE: Status: RESOLVED | Noted: 2023-06-22 | Resolved: 2024-07-25

## 2024-07-25 PROBLEM — G47.21 DELAYED SLEEP PHASE SYNDROME: Status: RESOLVED | Noted: 2023-06-22 | Resolved: 2024-07-25

## 2024-07-25 PROBLEM — M17.9 KNEE OSTEOARTHRITIS: Status: RESOLVED | Noted: 2023-06-22 | Resolved: 2024-07-25

## 2024-07-25 PROBLEM — R06.02 SHORTNESS OF BREATH AT REST: Status: RESOLVED | Noted: 2023-06-22 | Resolved: 2024-07-25

## 2024-07-25 PROBLEM — E66.9 OBESITY, CLASS II, BMI 35-39.9: Status: RESOLVED | Noted: 2018-09-12 | Resolved: 2024-07-25

## 2024-07-25 PROBLEM — M25.561 RIGHT KNEE PAIN: Status: RESOLVED | Noted: 2023-06-22 | Resolved: 2024-07-25

## 2024-07-25 PROBLEM — J30.89 ALLERGIC RHINITIS DUE TO DUST MITE: Status: RESOLVED | Noted: 2023-06-22 | Resolved: 2024-07-25

## 2024-07-25 PROBLEM — R19.7 DIARRHEA: Status: RESOLVED | Noted: 2023-06-22 | Resolved: 2024-07-25

## 2024-07-25 PROBLEM — R79.89 LFT ELEVATION: Status: RESOLVED | Noted: 2023-06-22 | Resolved: 2024-07-25

## 2024-07-25 PROBLEM — R53.83 FATIGUE: Status: RESOLVED | Noted: 2023-06-22 | Resolved: 2024-07-25

## 2024-07-25 PROBLEM — M19.049 HAND ARTHRITIS: Status: RESOLVED | Noted: 2023-06-22 | Resolved: 2024-07-25

## 2024-07-25 PROBLEM — M75.80 ROTATOR CUFF TENDINITIS: Status: RESOLVED | Noted: 2023-06-22 | Resolved: 2024-07-25

## 2024-07-25 PROBLEM — B30.9 ACUTE VIRAL CONJUNCTIVITIS OF BOTH EYES: Status: RESOLVED | Noted: 2023-06-22 | Resolved: 2024-07-25

## 2024-07-25 PROBLEM — M25.512 LEFT SHOULDER PAIN: Status: RESOLVED | Noted: 2023-06-22 | Resolved: 2024-07-25

## 2024-07-25 PROBLEM — M54.6 PAIN IN THORACIC SPINE: Status: RESOLVED | Noted: 2023-06-22 | Resolved: 2024-07-25

## 2024-07-25 PROBLEM — J04.0 LARYNGITIS: Status: RESOLVED | Noted: 2023-06-22 | Resolved: 2024-07-25

## 2024-07-25 PROBLEM — R07.89 ATYPICAL CHEST PAIN: Status: RESOLVED | Noted: 2017-02-27 | Resolved: 2024-07-25

## 2024-07-25 PROBLEM — J45.909 ASTHMATIC BRONCHITIS (HHS-HCC): Status: RESOLVED | Noted: 2023-06-22 | Resolved: 2024-07-25

## 2024-07-25 PROBLEM — R53.1 WEAKNESS: Status: RESOLVED | Noted: 2023-06-22 | Resolved: 2024-07-25

## 2024-07-25 PROBLEM — R07.9 CHEST PAIN: Status: RESOLVED | Noted: 2023-09-27 | Resolved: 2024-07-25

## 2024-07-25 PROBLEM — R11.0 NAUSEA IN ADULT: Status: RESOLVED | Noted: 2023-06-22 | Resolved: 2024-07-25

## 2024-07-25 PROBLEM — E66.812 CLASS 2 SEVERE OBESITY DUE TO EXCESS CALORIES WITH SERIOUS COMORBIDITY AND BODY MASS INDEX (BMI) OF 37.0 TO 37.9 IN ADULT: Status: RESOLVED | Noted: 2023-09-27 | Resolved: 2024-07-25

## 2024-07-25 PROBLEM — G47.00 INSOMNIA: Status: RESOLVED | Noted: 2023-06-22 | Resolved: 2024-07-25

## 2024-07-25 PROBLEM — J30.9 ALLERGIC RHINITIS: Status: RESOLVED | Noted: 2023-06-22 | Resolved: 2024-07-25

## 2024-07-25 PROBLEM — M75.102 ROTATOR CUFF TEAR, LEFT: Status: RESOLVED | Noted: 2023-09-27 | Resolved: 2024-07-25

## 2024-07-25 PROBLEM — R21 RASH AND NONSPECIFIC SKIN ERUPTION: Status: RESOLVED | Noted: 2023-06-22 | Resolved: 2024-07-25

## 2024-07-25 PROBLEM — E87.6 HYPOKALEMIA: Status: RESOLVED | Noted: 2023-07-21 | Resolved: 2024-07-25

## 2024-07-25 PROBLEM — D51.0 PERNICIOUS ANEMIA: Status: RESOLVED | Noted: 2023-06-22 | Resolved: 2024-07-25

## 2024-07-25 PROBLEM — M75.100 ROTATOR CUFF SYNDROME: Status: RESOLVED | Noted: 2018-01-23 | Resolved: 2024-07-25

## 2024-07-25 PROBLEM — J22 LOWER RESPIRATORY INFECTION: Status: RESOLVED | Noted: 2023-06-22 | Resolved: 2024-07-25

## 2024-07-25 PROBLEM — N95.1 MENOPAUSAL SYMPTOM: Status: RESOLVED | Noted: 2023-09-27 | Resolved: 2024-07-25

## 2024-07-25 PROBLEM — R32 URINARY INCONTINENCE: Status: RESOLVED | Noted: 2023-06-22 | Resolved: 2024-07-25

## 2024-07-25 PROBLEM — I95.9 LOW BLOOD PRESSURE: Status: RESOLVED | Noted: 2023-06-22 | Resolved: 2024-07-25

## 2024-07-25 PROBLEM — E66.812 OBESITY, CLASS II, BMI 35-39.9: Status: RESOLVED | Noted: 2018-09-12 | Resolved: 2024-07-25

## 2024-07-25 PROBLEM — H52.4 BILATERAL PRESBYOPIA: Status: RESOLVED | Noted: 2023-07-21 | Resolved: 2024-07-25

## 2024-07-25 PROBLEM — N95.2 ATROPHY OF VAGINA: Status: RESOLVED | Noted: 2023-06-22 | Resolved: 2024-07-25

## 2024-07-25 PROBLEM — E63.9 NUTRITIONAL DEFICIENCY: Status: RESOLVED | Noted: 2017-02-27 | Resolved: 2024-07-25

## 2024-07-25 PROBLEM — N62 MACROMASTIA: Status: RESOLVED | Noted: 2023-06-22 | Resolved: 2024-07-25

## 2024-07-25 PROBLEM — R05.3 CHRONIC COUGH: Status: RESOLVED | Noted: 2023-06-22 | Resolved: 2024-07-25

## 2024-07-25 PROBLEM — M19.012 ARTHRITIS OF LEFT SHOULDER REGION: Status: RESOLVED | Noted: 2023-09-27 | Resolved: 2024-07-25

## 2024-07-25 PROBLEM — M54.9 BACK PAIN: Status: RESOLVED | Noted: 2023-06-22 | Resolved: 2024-07-25

## 2024-07-25 PROBLEM — M75.20 BICEPS TENDONITIS: Status: RESOLVED | Noted: 2023-06-22 | Resolved: 2024-07-25

## 2024-07-25 PROBLEM — E83.51 HYPOCALCEMIA: Status: RESOLVED | Noted: 2023-06-22 | Resolved: 2024-07-25

## 2024-07-25 PROBLEM — F32.A DEPRESSIVE DISORDER: Status: RESOLVED | Noted: 2018-01-23 | Resolved: 2024-07-25

## 2024-07-25 PROBLEM — L73.8 OTHER SPECIFIED FOLLICULAR DISORDERS: Status: RESOLVED | Noted: 2021-08-05 | Resolved: 2024-07-25

## 2024-07-25 PROBLEM — R60.9 EDEMA: Status: RESOLVED | Noted: 2023-06-22 | Resolved: 2024-07-25

## 2024-07-25 PROCEDURE — 80061 LIPID PANEL: CPT

## 2024-07-25 PROCEDURE — 82728 ASSAY OF FERRITIN: CPT

## 2024-07-25 PROCEDURE — 87389 HIV-1 AG W/HIV-1&-2 AB AG IA: CPT

## 2024-07-25 PROCEDURE — 83540 ASSAY OF IRON: CPT

## 2024-07-25 PROCEDURE — 82306 VITAMIN D 25 HYDROXY: CPT

## 2024-07-25 PROCEDURE — 84443 ASSAY THYROID STIM HORMONE: CPT

## 2024-07-25 PROCEDURE — 3008F BODY MASS INDEX DOCD: CPT | Performed by: FAMILY MEDICINE

## 2024-07-25 PROCEDURE — 36415 COLL VENOUS BLD VENIPUNCTURE: CPT

## 2024-07-25 PROCEDURE — 82607 VITAMIN B-12: CPT

## 2024-07-25 PROCEDURE — 99214 OFFICE O/P EST MOD 30 MIN: CPT | Performed by: FAMILY MEDICINE

## 2024-07-25 PROCEDURE — 80053 COMPREHEN METABOLIC PANEL: CPT

## 2024-07-25 PROCEDURE — 82746 ASSAY OF FOLIC ACID SERUM: CPT

## 2024-07-25 PROCEDURE — 1036F TOBACCO NON-USER: CPT | Performed by: FAMILY MEDICINE

## 2024-07-25 PROCEDURE — 83550 IRON BINDING TEST: CPT

## 2024-07-25 PROCEDURE — 86803 HEPATITIS C AB TEST: CPT

## 2024-07-25 PROCEDURE — G0439 PPPS, SUBSEQ VISIT: HCPCS | Performed by: FAMILY MEDICINE

## 2024-07-25 PROCEDURE — 85027 COMPLETE CBC AUTOMATED: CPT

## 2024-07-25 PROCEDURE — 83036 HEMOGLOBIN GLYCOSYLATED A1C: CPT

## 2024-07-25 PROCEDURE — 87635 SARS-COV-2 COVID-19 AMP PRB: CPT

## 2024-07-25 ASSESSMENT — ACTIVITIES OF DAILY LIVING (ADL)
TAKING_MEDICATION: INDEPENDENT
BATHING: INDEPENDENT
MANAGING_FINANCES: INDEPENDENT
DOING_HOUSEWORK: INDEPENDENT
GROCERY_SHOPPING: INDEPENDENT
DRESSING: INDEPENDENT

## 2024-07-25 ASSESSMENT — PATIENT HEALTH QUESTIONNAIRE - PHQ9
1. LITTLE INTEREST OR PLEASURE IN DOING THINGS: SEVERAL DAYS
SUM OF ALL RESPONSES TO PHQ9 QUESTIONS 1 AND 2: 2
2. FEELING DOWN, DEPRESSED OR HOPELESS: SEVERAL DAYS

## 2024-07-25 ASSESSMENT — COLUMBIA-SUICIDE SEVERITY RATING SCALE - C-SSRS
1. IN THE PAST MONTH, HAVE YOU WISHED YOU WERE DEAD OR WISHED YOU COULD GO TO SLEEP AND NOT WAKE UP?: NO
2. HAVE YOU ACTUALLY HAD ANY THOUGHTS OF KILLING YOURSELF?: NO

## 2024-07-25 ASSESSMENT — SLEEP AND FATIGUE QUESTIONNAIRES
HOW LIKELY ARE YOU TO NOD OFF OR FALL ASLEEP WHILE WATCHING TV: MODERATE CHANCE OF DOZING
ESS TOTAL SCORE: 11
HOW LIKELY ARE YOU TO NOD OFF OR FALL ASLEEP WHILE SITTING AND TALKING TO SOMEONE: NO CHANCE OF DOZING
HOW LIKELY ARE YOU TO NOD OFF OR FALL ASLEEP WHILE LYING DOWN TO REST IN THE AFTERNOON WHEN CIRCUMSTANCES PERMIT: HIGH CHANCE OF DOZING
HOW LIKELY ARE YOU TO NOD OFF OR FALL ASLEEP WHEN YOU ARE A PASSENGER IN A CAR FOR AN HOUR WITHOUT A BREAK: NO CHANCE OF DOZING
HOW LIKELY ARE YOU TO NOD OFF OR FALL ASLEEP WHILE SITTING QUIETLY AFTER LUNCH WITHOUT ALCOHOL: HIGH CHANCE OF DOZING
HOW LIKELY ARE YOU TO NOD OFF OR FALL ASLEEP WHILE SITTING AND READING: HIGH CHANCE OF DOZING
SITING INACTIVE IN A PUBLIC PLACE LIKE A CLASS ROOM OR A MOVIE THEATER: NO CHANCE OF DOZING
HOW LIKELY ARE YOU TO NOD OFF OR FALL ASLEEP IN A CAR, WHILE STOPPED FOR A FEW MINUTES IN TRAFFIC: NO CHANCE OF DOZING

## 2024-07-26 LAB
25(OH)D3 SERPL-MCNC: 25 NG/ML (ref 30–100)
ALBUMIN SERPL BCP-MCNC: 4.2 G/DL (ref 3.4–5)
ALP SERPL-CCNC: 71 U/L (ref 33–136)
ALT SERPL W P-5'-P-CCNC: 19 U/L (ref 7–45)
ANION GAP SERPL CALC-SCNC: 15 MMOL/L (ref 10–20)
AST SERPL W P-5'-P-CCNC: 34 U/L (ref 9–39)
BILIRUB SERPL-MCNC: 0.4 MG/DL (ref 0–1.2)
BUN SERPL-MCNC: 13 MG/DL (ref 6–23)
CALCIUM SERPL-MCNC: 8.5 MG/DL (ref 8.6–10.6)
CHLORIDE SERPL-SCNC: 102 MMOL/L (ref 98–107)
CHOLEST SERPL-MCNC: 165 MG/DL (ref 0–199)
CHOLESTEROL/HDL RATIO: 3.3
CO2 SERPL-SCNC: 28 MMOL/L (ref 21–32)
CREAT SERPL-MCNC: 0.69 MG/DL (ref 0.5–1.05)
EGFRCR SERPLBLD CKD-EPI 2021: >90 ML/MIN/1.73M*2
ERYTHROCYTE [DISTWIDTH] IN BLOOD BY AUTOMATED COUNT: 14.3 % (ref 11.5–14.5)
EST. AVERAGE GLUCOSE BLD GHB EST-MCNC: 100 MG/DL
FERRITIN SERPL-MCNC: 41 NG/ML (ref 8–150)
FOLATE SERPL-MCNC: 23.5 NG/ML
GLUCOSE SERPL-MCNC: 92 MG/DL (ref 74–99)
HBA1C MFR BLD: 5.1 %
HCT VFR BLD AUTO: 39.5 % (ref 36–46)
HCV AB SER QL: NONREACTIVE
HDLC SERPL-MCNC: 50 MG/DL
HGB BLD-MCNC: 11.9 G/DL (ref 12–16)
HIV 1+2 AB+HIV1 P24 AG SERPL QL IA: NONREACTIVE
IRON SATN MFR SERPL: 12 % (ref 25–45)
IRON SERPL-MCNC: 48 UG/DL (ref 35–150)
LDLC SERPL CALC-MCNC: 97 MG/DL
MCH RBC QN AUTO: 30 PG (ref 26–34)
MCHC RBC AUTO-ENTMCNC: 30.1 G/DL (ref 32–36)
MCV RBC AUTO: 100 FL (ref 80–100)
NON HDL CHOLESTEROL: 115 MG/DL (ref 0–149)
NRBC BLD-RTO: 0 /100 WBCS (ref 0–0)
PLATELET # BLD AUTO: 245 X10*3/UL (ref 150–450)
POTASSIUM SERPL-SCNC: 3.5 MMOL/L (ref 3.5–5.3)
PROT SERPL-MCNC: 7.1 G/DL (ref 6.4–8.2)
RBC # BLD AUTO: 3.97 X10*6/UL (ref 4–5.2)
SARS-COV-2 RNA RESP QL NAA+PROBE: DETECTED
SODIUM SERPL-SCNC: 141 MMOL/L (ref 136–145)
TIBC SERPL-MCNC: 414 UG/DL (ref 240–445)
TRIGL SERPL-MCNC: 91 MG/DL (ref 0–149)
TSH SERPL-ACNC: 1.2 MIU/L (ref 0.44–3.98)
UIBC SERPL-MCNC: 366 UG/DL (ref 110–370)
VIT B12 SERPL-MCNC: >2000 PG/ML (ref 211–911)
VLDL: 18 MG/DL (ref 0–40)
WBC # BLD AUTO: 4.6 X10*3/UL (ref 4.4–11.3)

## 2024-07-29 ENCOUNTER — APPOINTMENT (OUTPATIENT)
Dept: PHYSICAL MEDICINE AND REHAB | Facility: CLINIC | Age: 59
End: 2024-07-29
Payer: COMMERCIAL

## 2024-07-29 DIAGNOSIS — R11.0 NAUSEA: ICD-10-CM

## 2024-07-29 DIAGNOSIS — B02.23 SHINGLES (HERPES ZOSTER) POLYNEUROPATHY: ICD-10-CM

## 2024-07-29 DIAGNOSIS — K58.0 IRRITABLE BOWEL SYNDROME WITH DIARRHEA: ICD-10-CM

## 2024-07-29 DIAGNOSIS — J30.89 ALLERGIC RHINITIS DUE TO DUST MITE: ICD-10-CM

## 2024-07-29 DIAGNOSIS — B00.9 HERPES SIMPLEX VIRUS (HSV) INFECTION: ICD-10-CM

## 2024-07-29 DIAGNOSIS — E03.9 HYPOTHYROIDISM, UNSPECIFIED TYPE: ICD-10-CM

## 2024-07-31 ENCOUNTER — TELEPHONE (OUTPATIENT)
Dept: PRIMARY CARE | Facility: CLINIC | Age: 59
End: 2024-07-31
Payer: COMMERCIAL

## 2024-07-31 DIAGNOSIS — E55.9 VITAMIN D DEFICIENCY: Primary | ICD-10-CM

## 2024-07-31 DIAGNOSIS — D50.8 OTHER IRON DEFICIENCY ANEMIA: ICD-10-CM

## 2024-07-31 RX ORDER — DULOXETIN HYDROCHLORIDE 30 MG/1
30 CAPSULE, DELAYED RELEASE ORAL DAILY
Qty: 90 CAPSULE | Refills: 1 | Status: SHIPPED | OUTPATIENT
Start: 2024-07-31

## 2024-07-31 RX ORDER — VALACYCLOVIR HYDROCHLORIDE 500 MG/1
500 TABLET, FILM COATED ORAL DAILY
Qty: 90 TABLET | Refills: 1 | Status: SHIPPED | OUTPATIENT
Start: 2024-07-31

## 2024-07-31 RX ORDER — LEVOTHYROXINE SODIUM 75 UG/1
75 TABLET ORAL DAILY
Qty: 90 TABLET | Refills: 1 | Status: SHIPPED | OUTPATIENT
Start: 2024-07-31

## 2024-07-31 RX ORDER — LIDOCAINE 50 MG/G
OINTMENT TOPICAL AS NEEDED
Qty: 30 G | Refills: 0 | Status: CANCELLED | OUTPATIENT
Start: 2024-07-31 | End: 2025-07-31

## 2024-07-31 RX ORDER — DIPHENOXYLATE HYDROCHLORIDE AND ATROPINE SULFATE 2.5; .025 MG/1; MG/1
1 TABLET ORAL 2 TIMES DAILY PRN
Qty: 30 TABLET | Refills: 0 | Status: CANCELLED | OUTPATIENT
Start: 2024-07-31

## 2024-07-31 RX ORDER — ACETAMINOPHEN 500 MG
2000 TABLET ORAL DAILY
Qty: 90 CAPSULE | Refills: 3 | Status: SHIPPED | OUTPATIENT
Start: 2024-07-31

## 2024-07-31 RX ORDER — LEVOCETIRIZINE DIHYDROCHLORIDE 5 MG/1
5 TABLET, FILM COATED ORAL DAILY
Qty: 90 TABLET | Refills: 1 | Status: SHIPPED | OUTPATIENT
Start: 2024-07-31

## 2024-07-31 RX ORDER — ONDANSETRON 4 MG/1
4 TABLET, FILM COATED ORAL EVERY 8 HOURS PRN
Qty: 30 TABLET | Refills: 1 | Status: SHIPPED | OUTPATIENT
Start: 2024-07-31

## 2024-07-31 RX ORDER — CYCLOSPORINE 0.5 MG/ML
1 EMULSION OPHTHALMIC EVERY 12 HOURS
Qty: 60 EACH | Status: CANCELLED | OUTPATIENT
Start: 2024-07-31

## 2024-07-31 RX ORDER — FERROUS SULFATE 325(65) MG
325 TABLET, DELAYED RELEASE (ENTERIC COATED) ORAL 3 TIMES WEEKLY
Qty: 39 TABLET | Refills: 3 | Status: SHIPPED | OUTPATIENT
Start: 2024-07-31

## 2024-08-05 NOTE — PROGRESS NOTES
Subjective    Patient ID: Stephanie Gallardo is a 59 y.o. female.    Chief Complaint: Bilateral shoulder pain R > L     Last Surgery: Left arthroscopic rotator cuff repair with arthroscopic biceps tenodesis.   Last Surgery Date: 08/25/23    HPI  She continues to struggle a little bit with her function.  Her other shoulder is also bothering her.  She is sleeping a little bit better.  She is early in the recovery.  She is doing therapy and seems to think that that is helping.    05/14/24  Stephanie returns to the clinic today for a repeat follow up visit regarding her bilateral shoulders.    She reports she still struggles with her left shoulder. Putting on her bra is very difficult.     8/06/24   Stephanie Gallardo is a 59 y.o. female returning to the clinic for a repeat follow up visit regarding her bilateral shoulders.      Objective   Patient is a well-developed, well-nourished female in no acute distress.  Breathes with normal chest rises.  Pupils are round and symmetric today.  Awake, alert, and oriented x3.      Examination of the right shoulder today reveals the skin to be intact. There is no sign of any atrophy, lesions, or abrasions. There is no pain to palpation of the bony prominences. Cervical lymphadenopathy examined, and this was negative. Patient had 5 out of 5 wrist flexion, extension, and thumb extension, bilaterally. Sensation was intact to light touch to median, ulnar, radial, axillary, and musculocutaneous nerves bilaterally. Positive radial pulse bilaterally.   Range of motion of the right shoulder revealed 0-170° of forward elevation, 0-60° of external rotation, and internal rotation up to T-12.  Positive Camarena sign positive Neer sign    Examination of the left shoulder reveals well healed surgical incisions. Minimal swelling. No warmth or erythema. No ecchymosis. Sutures were removed today and steris strips placed on incision sites on top of shoulder. Neurovascularly intact distally. 5  out of 5 wrist, hand and thumb flexion and extension without pain. Full active range of motion of elbow.  50 degrees of forward elevation 13 degrees passively of forward elevation. 20degrees external rotation. 3/5 Luan's testing    Image Results:        Assessment/Plan   Encounter Diagnoses:  No diagnosis found.  FELA is 9 months status post left arthroscopic rotator cuff repair, decompression, debridement and arthroscopic biceps tenodesis on 8/25/23.     We did bilateral shoulder injections for her today as she is in pain. We updated her physical therapy script. We are going to see her back in 3 month to see how she is doing.     No orders of the defined types were placed in this encounter.    Follow up in 3 month    Scribe Attestation  By signing my name below, I, Spike Harley   attest that this documentation has been prepared under the direction and in the presence of Octavio Tompkins MD.

## 2024-08-06 ENCOUNTER — APPOINTMENT (OUTPATIENT)
Dept: ORTHOPEDIC SURGERY | Facility: HOSPITAL | Age: 59
End: 2024-08-06
Payer: COMMERCIAL

## 2024-08-07 ENCOUNTER — APPOINTMENT (OUTPATIENT)
Dept: PHYSICAL MEDICINE AND REHAB | Facility: CLINIC | Age: 59
End: 2024-08-07
Payer: COMMERCIAL

## 2024-08-08 ENCOUNTER — DOCUMENTATION (OUTPATIENT)
Dept: PHYSICAL THERAPY | Facility: CLINIC | Age: 59
End: 2024-08-08
Payer: COMMERCIAL

## 2024-08-08 NOTE — PROGRESS NOTES
Physical Therapy                 Therapy Communication Note    Patient Name: Stephanie Gallardo  MRN: [id number]  Today's Date: 8/8/2024     Discipline: Physical Therapy    Missed Visit Reason:      Missed Time: No Show    Comment: No show evaluation and treat

## 2024-08-09 NOTE — PATIENT INSTRUCTIONS
-Ice on and off for the next 24 hours if injection sites are sore. Do gentle range of motion exercises. Try to do them every hour for about half a minute or so, in every direction that the affected part goes. Avoid heavy lifting for the next 2 days.   -Increase Cymbalta to 60 mg  -consider gabapentin again, Lyrica in the future   -Continue Jhonatan chi, pilates, home exercises, PT.   -Continue with acupuncture as needed  -Sleep study ordered previously  -Follow up with Broderick Horton to discuss L5-S1 epidural steroid injections if you want  -Continue OMT as needed  -Knee x-rays ordered, hip x-rays ordered  -DEXA scan ordered  -Follow up as needed, you can message me on my chart
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
## 2024-08-12 ENCOUNTER — APPOINTMENT (OUTPATIENT)
Dept: PHYSICAL MEDICINE AND REHAB | Facility: CLINIC | Age: 59
End: 2024-08-12
Payer: COMMERCIAL

## 2024-08-12 VITALS
DIASTOLIC BLOOD PRESSURE: 82 MMHG | HEART RATE: 96 BPM | BODY MASS INDEX: 34.39 KG/M2 | WEIGHT: 214 LBS | SYSTOLIC BLOOD PRESSURE: 116 MMHG | TEMPERATURE: 98.1 F | HEIGHT: 66 IN | OXYGEN SATURATION: 96 %

## 2024-08-12 DIAGNOSIS — M25.562 CHRONIC PAIN OF BOTH KNEES: ICD-10-CM

## 2024-08-12 DIAGNOSIS — G89.29 CHRONIC PAIN OF BOTH SHOULDERS: Primary | ICD-10-CM

## 2024-08-12 DIAGNOSIS — Z09 ENCOUNTER FOR FOLLOW-UP EXAMINATION AFTER COMPLETED TREATMENT FOR CONDITIONS OTHER THAN MALIGNANT NEOPLASM: ICD-10-CM

## 2024-08-12 DIAGNOSIS — M25.859 FEMOROACETABULAR IMPINGEMENT: ICD-10-CM

## 2024-08-12 DIAGNOSIS — M79.7 FIBROMYALGIA: ICD-10-CM

## 2024-08-12 DIAGNOSIS — G89.29 CHRONIC BILATERAL LOW BACK PAIN WITHOUT SCIATICA: ICD-10-CM

## 2024-08-12 DIAGNOSIS — M54.50 CHRONIC BILATERAL LOW BACK PAIN WITHOUT SCIATICA: ICD-10-CM

## 2024-08-12 DIAGNOSIS — B02.23 SHINGLES (HERPES ZOSTER) POLYNEUROPATHY: ICD-10-CM

## 2024-08-12 DIAGNOSIS — M25.511 CHRONIC PAIN OF BOTH SHOULDERS: Primary | ICD-10-CM

## 2024-08-12 DIAGNOSIS — G89.29 CHRONIC PAIN OF BOTH KNEES: ICD-10-CM

## 2024-08-12 DIAGNOSIS — M53.3 SACROILIAC JOINT DYSFUNCTION OF BOTH SIDES: ICD-10-CM

## 2024-08-12 DIAGNOSIS — M54.2 CHRONIC NECK PAIN: ICD-10-CM

## 2024-08-12 DIAGNOSIS — M54.16 LUMBAR RADICULITIS: ICD-10-CM

## 2024-08-12 DIAGNOSIS — M25.561 CHRONIC PAIN OF BOTH KNEES: ICD-10-CM

## 2024-08-12 DIAGNOSIS — M25.512 CHRONIC PAIN OF BOTH SHOULDERS: Primary | ICD-10-CM

## 2024-08-12 DIAGNOSIS — G89.29 CHRONIC PAIN OF RIGHT KNEE: ICD-10-CM

## 2024-08-12 DIAGNOSIS — M25.552 BILATERAL HIP PAIN: ICD-10-CM

## 2024-08-12 DIAGNOSIS — E55.9 VITAMIN D DEFICIENCY: ICD-10-CM

## 2024-08-12 DIAGNOSIS — G89.29 CHRONIC NECK PAIN: ICD-10-CM

## 2024-08-12 DIAGNOSIS — M25.561 CHRONIC PAIN OF RIGHT KNEE: ICD-10-CM

## 2024-08-12 DIAGNOSIS — M85.80 OSTEOPENIA, UNSPECIFIED LOCATION: ICD-10-CM

## 2024-08-12 DIAGNOSIS — R60.1 GENERALIZED EDEMA: ICD-10-CM

## 2024-08-12 DIAGNOSIS — M54.6 PAIN IN THORACIC SPINE: ICD-10-CM

## 2024-08-12 DIAGNOSIS — M79.18 MYOFASCIAL PAIN SYNDROME: ICD-10-CM

## 2024-08-12 DIAGNOSIS — M25.551 BILATERAL HIP PAIN: ICD-10-CM

## 2024-08-12 DIAGNOSIS — G47.33 OBSTRUCTIVE SLEEP APNEA: ICD-10-CM

## 2024-08-12 PROCEDURE — 20553 NJX 1/MLT TRIGGER POINTS 3/>: CPT | Performed by: PHYSICAL MEDICINE & REHABILITATION

## 2024-08-12 PROCEDURE — 99215 OFFICE O/P EST HI 40 MIN: CPT | Performed by: PHYSICAL MEDICINE & REHABILITATION

## 2024-08-12 RX ORDER — GABAPENTIN 300 MG/1
1 CAPSULE ORAL
COMMUNITY
Start: 2024-07-28

## 2024-08-12 RX ORDER — CHOLECALCIFEROL (VITAMIN D3) 50 MCG
100 TABLET ORAL DAILY
Qty: 60 TABLET | Refills: 11 | Status: SHIPPED | OUTPATIENT
Start: 2024-08-12 | End: 2025-08-12

## 2024-08-12 RX ORDER — DULOXETIN HYDROCHLORIDE 60 MG/1
60 CAPSULE, DELAYED RELEASE ORAL DAILY
Qty: 30 CAPSULE | Refills: 3 | Status: SHIPPED | OUTPATIENT
Start: 2024-08-12 | End: 2024-12-10

## 2024-08-13 ENCOUNTER — APPOINTMENT (OUTPATIENT)
Dept: ORTHOPEDIC SURGERY | Facility: HOSPITAL | Age: 59
End: 2024-08-13
Payer: MEDICARE

## 2024-08-20 ENCOUNTER — APPOINTMENT (OUTPATIENT)
Dept: PHYSICAL THERAPY | Facility: CLINIC | Age: 59
End: 2024-08-20
Payer: COMMERCIAL

## 2024-08-25 ENCOUNTER — CLINICAL SUPPORT (OUTPATIENT)
Dept: SLEEP MEDICINE | Facility: CLINIC | Age: 59
End: 2024-08-25
Payer: COMMERCIAL

## 2024-08-25 DIAGNOSIS — G47.33 OBSTRUCTIVE SLEEP APNEA: ICD-10-CM

## 2024-08-25 PROCEDURE — 95810 POLYSOM 6/> YRS 4/> PARAM: CPT | Performed by: INTERNAL MEDICINE

## 2024-08-26 ENCOUNTER — HOSPITAL ENCOUNTER (OUTPATIENT)
Dept: RADIOLOGY | Facility: CLINIC | Age: 59
Discharge: HOME | End: 2024-08-26
Payer: COMMERCIAL

## 2024-08-26 ENCOUNTER — APPOINTMENT (OUTPATIENT)
Dept: OTOLARYNGOLOGY | Facility: CLINIC | Age: 59
End: 2024-08-26
Payer: COMMERCIAL

## 2024-08-26 VITALS — WEIGHT: 221.7 LBS | HEIGHT: 65 IN | BODY MASS INDEX: 36.94 KG/M2

## 2024-08-26 DIAGNOSIS — R49.0 HOARSENESS OF VOICE: ICD-10-CM

## 2024-08-26 DIAGNOSIS — K21.9 GASTROESOPHAGEAL REFLUX DISEASE, UNSPECIFIED WHETHER ESOPHAGITIS PRESENT: ICD-10-CM

## 2024-08-26 DIAGNOSIS — J38.1 VOCAL CORD POLYP: Primary | ICD-10-CM

## 2024-08-26 DIAGNOSIS — M25.552 BILATERAL HIP PAIN: ICD-10-CM

## 2024-08-26 DIAGNOSIS — G89.29 CHRONIC PAIN OF BOTH KNEES: ICD-10-CM

## 2024-08-26 DIAGNOSIS — M25.561 CHRONIC PAIN OF BOTH KNEES: ICD-10-CM

## 2024-08-26 DIAGNOSIS — M25.551 BILATERAL HIP PAIN: ICD-10-CM

## 2024-08-26 DIAGNOSIS — M25.562 CHRONIC PAIN OF BOTH KNEES: ICD-10-CM

## 2024-08-26 PROCEDURE — 1036F TOBACCO NON-USER: CPT

## 2024-08-26 PROCEDURE — 31579 LARYNGOSCOPY TELESCOPIC: CPT

## 2024-08-26 PROCEDURE — 99214 OFFICE O/P EST MOD 30 MIN: CPT

## 2024-08-26 PROCEDURE — 73562 X-RAY EXAM OF KNEE 3: CPT | Mod: 50

## 2024-08-26 PROCEDURE — 73522 X-RAY EXAM HIPS BI 3-4 VIEWS: CPT

## 2024-08-26 PROCEDURE — 3008F BODY MASS INDEX DOCD: CPT

## 2024-08-26 RX ORDER — FLUTICASONE PROPIONATE 50 MCG
SPRAY, SUSPENSION (ML) NASAL
COMMUNITY
Start: 2024-08-22

## 2024-08-26 RX ORDER — NYSTATIN 100000 [USP'U]/G
1 POWDER TOPICAL
COMMUNITY
Start: 2024-08-19

## 2024-08-26 RX ORDER — ALBUTEROL SULFATE 90 UG/1
INHALANT RESPIRATORY (INHALATION)
COMMUNITY
Start: 2024-08-22

## 2024-08-26 RX ORDER — MAGNESIUM CARB/ALUMINUM HYDROX 105-160MG
2 TABLET,CHEWABLE ORAL 4 TIMES DAILY
Qty: 240 TABLET | Refills: 2 | Status: SHIPPED | OUTPATIENT
Start: 2024-08-26 | End: 2025-08-26

## 2024-08-26 ASSESSMENT — PATIENT HEALTH QUESTIONNAIRE - PHQ9
2. FEELING DOWN, DEPRESSED OR HOPELESS: NOT AT ALL
1. LITTLE INTEREST OR PLEASURE IN DOING THINGS: NOT AT ALL
SUM OF ALL RESPONSES TO PHQ9 QUESTIONS 1 AND 2: 0

## 2024-08-26 NOTE — PROGRESS NOTES
[facility] TECH NOTE:     Patient: Stephanie Gallardo   MRN//AGE: [id number]  1965  59 y.o.   Technologist: Lobo Gonzales   Room: 4   Service Date: 2024        Sleep Testing Location: [facility]: 18    TECHNOLOGIST SLEEP STUDY PROCEDURE NOTE:   This sleep study is being conducted according to the policies and procedures outlined by the [employer] accreditation standards.  The sleep study procedure and processes involved during this appointment was explained to the patient/patient’s family, questions were answered. The patient/family verbalized understanding.      The patient is a 59 y.o. year old female scheduled for aDiagnostic PSG Split night with montage of:  PSG . she arrived for her appointment.      The study that was ultimately completed was aDiagnostic PSG Split night with montage of:  PSG .    The full study Was completed.  Patient questionnaires completed?: yes     Consents signed? yes    Initial Fall Risk Screening:     Stephanie has not fallen in the last 6 months. her did not result in injury. Stephanie does have a fear of falling. He does not need assistance with sitting, standing, or walking. she does not need assistance walking in her home. she does not need assistance in an unfamiliar setting. The patient is notusing an assistive device.     Brief Study observations: none     Deviation to order/protocol and reason: none      If PAP, which was preferred mask/pressure/mode: none      Other: Patient took Trazodone prior to start of study.     After the procedure, the patient/family was informed to ensure followup with ordering clinician for testing results.      Technologist: Lobo Gonzales

## 2024-08-26 NOTE — PROGRESS NOTES
Reason For Consult  Chief Complaint   Patient presents with    New Patient Visit     Vocal cord polyps.        HISTORY OF PRESENT ILLNESS:  Stephanie Gallardo, who is a 59 y.o. female presenting for an initial visit for vocal cord check regarding bilateral mid cord polyps causing hoarseness. The patient was previously seen by Dr. Wright in  for bilateral mid cord polyps and worked with Katy from Speech therapy. The patient has high voice demand between acting, comedy, and performing and it is difficult to perform voice resting. The patient has underlying hoarseness, but worsened with Covid recently.  The patient reports symptoms of GERD/acid reflux regularly. The patient denies any shortness of breath. Swallowing can be more difficult with tougher solids such as steak and with capsules/larger pills. The patient denies any smoking history.     Past Medical History  She has a past medical history of Acute atopic conjunctivitis, bilateral (2018), Anxiety disorder, unspecified (2017), Bitten or stung by nonvenomous insect and other nonvenomous arthropods, initial encounter (2014), Cellulitis of left toe (2017), Cellulitis of right toe (2017), Cellulitis of unspecified toe (2017), Complete or unspecified spontaneous  without complication ([facility]), Contusion of left forearm, initial encounter (10/12/2016), COVID-19 (2022), Dry eye syndrome of bilateral lacrimal glands (2019), Encounter for gynecological examination (general) (routine) without abnormal findings (2018), Encounter for gynecological examination (general) (routine) without abnormal findings (2016), Encounter for screening for cardiovascular disorders (06/10/2019), Encounter for screening for diabetes mellitus (2019), Encounter for screening for other disorder (2019), Encounter for screening for other viral diseases (2015), Encounter for screening, unspecified  (02/22/2019), Foreign body in conjunctival sac, left eye, initial encounter (02/11/2020), Foreign body in conjunctival sac, right eye, initial encounter (02/11/2020), Ingrowing nail (08/09/2017), Injury of conjunctiva and corneal abrasion without foreign body, right eye, initial encounter (06/28/2019), Injury of conjunctiva and corneal abrasion without foreign body, right eye, initial encounter (11/11/2019), Localized edema (06/18/2018), Melena (04/09/2018), Onycholysis (05/19/2017), Other chest pain (04/03/2017), Other chest pain (12/26/2019), Other conditions influencing health status, Other conditions influencing health status (02/10/2022), Other conditions influencing health status, Other conditions influencing health status (10/24/2013), Pain in left knee (03/09/2016), Pain in right shoulder (11/03/2014), Personal history of diseases of the skin and subcutaneous tissue (08/05/2021), Personal history of other benign neoplasm (07/23/2018), Personal history of other diseases of the female genital tract, Personal history of other diseases of the female genital tract (08/15/2013), Personal history of other diseases of the musculoskeletal system and connective tissue (08/15/2013), Personal history of other diseases of the respiratory system (04/24/2018), Personal history of other diseases of the respiratory system (10/31/2018), Personal history of other diseases of the respiratory system (04/24/2018), Personal history of other endocrine, nutritional and metabolic disease (02/22/2019), Personal history of other endocrine, nutritional and metabolic disease (01/07/2019), Personal history of other infectious and parasitic diseases, Personal history of other medical treatment, Personal history of other medical treatment (10/24/2013), Personal history of other mental and behavioral disorders (04/24/2018), Personal history of other mental and behavioral disorders (04/24/2018), Personal history of other specified conditions  (2018), Personal history of other specified conditions (2020), Personal history of other specified conditions (01/10/2021), Personal history of other specified conditions (2015), Personal history of other specified conditions (10/24/2013), Personal history of other specified conditions (2017), Personal history of other specified conditions (2018), Personal history of other specified conditions (2022), Pyogenic granuloma (2017), Segmental and somatic dysfunction of cervical region (2018), Segmental and somatic dysfunction of cervical region (11/10/2014), Segmental and somatic dysfunction of rib cage (2014), Segmental and somatic dysfunction of thoracic region (2018), Segmental and somatic dysfunction of thoracic region (11/10/2014), Segmental and somatic dysfunction of upper extremity (11/10/2014), Strain of muscle, fascia and tendon of unspecified hip, initial encounter (2017), Syncope and collapse (2019), Twin pregnancy, unspecified number of placenta and unspecified number of amniotic sacs, unspecified trimester ([facility]), Unspecified astigmatism, bilateral (2022), Unspecified astigmatism, unspecified eye (2019), Unspecified disorder of synovium and tendon, unspecified shoulder (2014), and Unspecified injury of right lower leg, initial encounter (2017).  Surgical History  She has a past surgical history that includes Other surgical history (08/15/2013); Gastric bypass (08/15/2013); Gastric bypass (10/29/2021); Other surgical history (2018); Laparoscopy diagnostic / biopsy / aspiration / lysis (2014); Other surgical history (2014);  section, classic (2014); CT angio coronary art with heartflow if score >30% (2019); and Shoulder open rotator cuff repair (Left).     Social History  She reports that she has never smoked. She has never used smokeless tobacco. She reports current alcohol  "use of about 1.0 standard drink of alcohol per week. She reports that she does not use drugs.    Allergies  Bee pollen, Cat dander, Dog dander, Grass pollen, Other, Ragweed pollen, and Amoxicillin-pot clavulanate    Review of Systems  All 10 systems were reviewed and negative except for above.      Physical Exam  CONSTITUTIONAL: Well developed, well nourished.    VOICE: mild hoarseness  RESPIRATION: Breathing comfortably, no stridor.    NEURO: Alert and oriented x3, cranial nerves II-XII intact and symmetric bilaterally.    EARS: Normal external ears, external auditory canals, normal hearing to conversational voice.    NOSE: External nose midline, anterior rhinoscopy is normal with limited visualization to the anterior aspect of the interior turbinates. No lesions noted.     ORAL CAVITY/OROPHARYNX/LIPS: Normal mucous membranes, normal floor of mouth/tongue/OP, no masses or lesions are noted.    SKIN: Neck skin is normal.  PSYCH: Alert and oriented with appropriate mood and affect.        Last Recorded Vitals  Height 1.651 m (5' 5\"), weight 101 kg (221 lb 11.2 oz).    Procedure  PROCEDURE NOTE:  Recommended flexible laryngoscopy/stroboscopy.  Risks, benefits,  and alternatives were explained.  They wish to proceed and provide verbal consent.     PROCEDURE: Flexible laryngoscopy with stroboscopy, CPT 67716     POSTPROCEDURE DIAGNOSIS:    INDICATIONS: Inability to tolerate mirror exam or abnormal findings on mirror, Flexible Laryngoscopy/Stroboscopy performed to assess one of the followin. Diagnosis of symptomatic disorder involving the voice, swallow, upper aerodigestive tract, including [place] disorders, or  2. Preoperative evaluation of vocal cord function for individuals undergoing surgery where the RLN or vagus nerves are at risk of injury, or  3. Further evaluation of abnormalities of the upper aerodigestive tract discovered by another modality, such as CT, MRI, bronchoscopy or EGD    Description of " Procedure:    After adequate afrin and lidocaine spray, I advanced the endoscope.  Visualization of the nasopharynx, vallecula, posterior pharyngeal walls, pyriform, epiglottis and post cricoid areas was unremarkable.  The following laryngeal findings were noted:    vocal cord movement was normal  closure was slight hourglass  Mucosal wave was symmetrical, with decreased amplitude bilaterally.   Compression was increased AP  edema was mild bilaterally  interarytenoid edema present  Bilateral mid cord polyps  the subglottis was widely patent  Pharyngeal wall squeeze was normal    Procedure well tolerated.       ASSESSMENT AND PLAN:   This is an initial visit for vocal cord check with history of bilateral mid cord polyps with clinical findings notable for good vocal cord movement. Slight hourglass closure. Katy from SLP in room and patient to continue to implement strategies learned in previous speech therapy sessions. IA redness and erythema- Will add gaviscon after each meal and at bedtime. Patient agreeable to plan. Can follow up in 2 months if symptoms still persisting. All questions were answered.    Diagnoses are exacerbated by: High voice demand.    We discussed the treatment options to include, medical and surgical options.  We have decided to proceed as follows:  Gaviscon after each meal and at bedtime- Order sent [facility]  Follow up in 1-2 months if symptoms persist. Can repeat scope exam in 6 months to a year for routine vocal cord check.

## 2024-08-27 ENCOUNTER — HOSPITAL ENCOUNTER (OUTPATIENT)
Dept: RADIOLOGY | Facility: CLINIC | Age: 59
End: 2024-08-27
Payer: COMMERCIAL

## 2024-08-31 ENCOUNTER — TELEPHONE (OUTPATIENT)
Dept: PHYSICAL MEDICINE AND REHAB | Facility: CLINIC | Age: 59
End: 2024-08-31
Payer: COMMERCIAL

## 2024-08-31 DIAGNOSIS — E55.9 VITAMIN D DEFICIENCY: Primary | ICD-10-CM

## 2024-08-31 DIAGNOSIS — G47.33 OBSTRUCTIVE SLEEP APNEA: Primary | ICD-10-CM

## 2024-08-31 RX ORDER — GABAPENTIN 300 MG/1
300 CAPSULE ORAL
Qty: 60 CAPSULE | Refills: 0 | Status: SHIPPED | OUTPATIENT
Start: 2024-08-31 | End: 2024-09-03 | Stop reason: SDUPTHER

## 2024-09-03 ENCOUNTER — TELEPHONE (OUTPATIENT)
Dept: PHYSICAL MEDICINE AND REHAB | Facility: CLINIC | Age: 59
End: 2024-09-03
Payer: COMMERCIAL

## 2024-09-03 DIAGNOSIS — E55.9 VITAMIN D DEFICIENCY: ICD-10-CM

## 2024-09-03 DIAGNOSIS — B02.23 SHINGLES (HERPES ZOSTER) POLYNEUROPATHY: ICD-10-CM

## 2024-09-03 RX ORDER — GABAPENTIN 300 MG/1
300 CAPSULE ORAL
Qty: 60 CAPSULE | Refills: 0 | Status: SHIPPED | OUTPATIENT
Start: 2024-09-03

## 2024-09-03 RX ORDER — DULOXETIN HYDROCHLORIDE 60 MG/1
60 CAPSULE, DELAYED RELEASE ORAL DAILY
Qty: 30 CAPSULE | Refills: 3 | Status: SHIPPED | OUTPATIENT
Start: 2024-09-03 | End: 2025-01-01

## 2024-09-03 RX ORDER — CHOLECALCIFEROL (VITAMIN D3) 50 MCG
100 TABLET ORAL DAILY
Qty: 60 TABLET | Refills: 11 | Status: SHIPPED | OUTPATIENT
Start: 2024-09-03 | End: 2025-09-03

## 2024-09-03 RX ORDER — CHOLECALCIFEROL (VITAMIN D3) 50 MCG
100 TABLET ORAL DAILY
Qty: 60 TABLET | Refills: 11 | Status: SHIPPED | OUTPATIENT
Start: 2024-09-03 | End: 2024-09-03 | Stop reason: SDUPTHER

## 2024-09-03 NOTE — TELEPHONE ENCOUNTER
Patient called re vitamin d refill since appears went to the wrong pharmacy last time ordered during visit w Dr. Cr. Also she asked re gabapentin, this was also reordered.

## 2024-09-03 NOTE — TELEPHONE ENCOUNTER
----- Message from Angelita Cr sent at 8/31/2024  5:55 PM EDT -----  Pls let her know that her hip and knee xrays were normal. Her sleep study showed moderate sleep apnea. I placed a referral to a sleep medicine physician for a cpap. thanks  ----- Message -----  From: Micki Jaramillo - Lab Results In  Sent: 8/30/2024   3:24 PM EDT  To: Angelita Cr MD

## 2024-09-10 ENCOUNTER — APPOINTMENT (OUTPATIENT)
Dept: ORTHOPEDIC SURGERY | Facility: HOSPITAL | Age: 59
End: 2024-09-10
Payer: COMMERCIAL

## 2024-09-10 ENCOUNTER — EVALUATION (OUTPATIENT)
Dept: PHYSICAL THERAPY | Facility: CLINIC | Age: 59
End: 2024-09-10
Payer: COMMERCIAL

## 2024-09-10 DIAGNOSIS — M54.2 CHRONIC NECK PAIN: ICD-10-CM

## 2024-09-10 DIAGNOSIS — G89.29 CHRONIC PAIN OF BOTH SHOULDERS: Primary | ICD-10-CM

## 2024-09-10 DIAGNOSIS — M54.16 LUMBAR RADICULITIS: ICD-10-CM

## 2024-09-10 DIAGNOSIS — M25.561 CHRONIC PAIN OF RIGHT KNEE: ICD-10-CM

## 2024-09-10 DIAGNOSIS — M79.7 FIBROMYALGIA: ICD-10-CM

## 2024-09-10 DIAGNOSIS — G89.29 CHRONIC NECK PAIN: ICD-10-CM

## 2024-09-10 DIAGNOSIS — M54.6 PAIN IN THORACIC SPINE: ICD-10-CM

## 2024-09-10 DIAGNOSIS — M25.511 CHRONIC PAIN OF BOTH SHOULDERS: Primary | ICD-10-CM

## 2024-09-10 DIAGNOSIS — G89.29 CHRONIC BILATERAL LOW BACK PAIN WITHOUT SCIATICA: ICD-10-CM

## 2024-09-10 DIAGNOSIS — G89.29 CHRONIC PAIN OF RIGHT KNEE: ICD-10-CM

## 2024-09-10 DIAGNOSIS — M79.18 MYOFASCIAL PAIN SYNDROME: ICD-10-CM

## 2024-09-10 DIAGNOSIS — M53.3 SACROILIAC JOINT DYSFUNCTION OF BOTH SIDES: ICD-10-CM

## 2024-09-10 DIAGNOSIS — M25.859 FEMOROACETABULAR IMPINGEMENT: ICD-10-CM

## 2024-09-10 DIAGNOSIS — M25.512 CHRONIC PAIN OF BOTH SHOULDERS: Primary | ICD-10-CM

## 2024-09-10 DIAGNOSIS — M54.50 CHRONIC BILATERAL LOW BACK PAIN WITHOUT SCIATICA: ICD-10-CM

## 2024-09-10 PROCEDURE — 97161 PT EVAL LOW COMPLEX 20 MIN: CPT | Mod: GP

## 2024-09-10 PROCEDURE — 97110 THERAPEUTIC EXERCISES: CPT | Mod: GP

## 2024-09-10 PROCEDURE — 97010 HOT OR COLD PACKS THERAPY: CPT | Mod: GP

## 2024-09-10 ASSESSMENT — PATIENT HEALTH QUESTIONNAIRE - PHQ9
2. FEELING DOWN, DEPRESSED OR HOPELESS: SEVERAL DAYS
SUM OF ALL RESPONSES TO PHQ9 QUESTIONS 1 AND 2: 1
1. LITTLE INTEREST OR PLEASURE IN DOING THINGS: NOT AT ALL

## 2024-09-10 ASSESSMENT — ENCOUNTER SYMPTOMS
DEPRESSION: 1
LOSS OF SENSATION IN FEET: 0
OCCASIONAL FEELINGS OF UNSTEADINESS: 0
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
## 2024-09-19 ENCOUNTER — APPOINTMENT (OUTPATIENT)
Dept: PHYSICAL THERAPY | Facility: CLINIC | Age: 59
End: 2024-09-19
Payer: COMMERCIAL

## 2024-09-24 ENCOUNTER — APPOINTMENT (OUTPATIENT)
Dept: ORTHOPEDIC SURGERY | Facility: HOSPITAL | Age: 59
End: 2024-09-24
Payer: COMMERCIAL

## 2024-09-30 ENCOUNTER — TREATMENT (OUTPATIENT)
Dept: PHYSICAL THERAPY | Facility: CLINIC | Age: 59
End: 2024-09-30
Payer: COMMERCIAL

## 2024-09-30 DIAGNOSIS — M54.16 LUMBAR RADICULITIS: ICD-10-CM

## 2024-09-30 DIAGNOSIS — M25.859 FEMOROACETABULAR IMPINGEMENT: ICD-10-CM

## 2024-09-30 DIAGNOSIS — M25.512 CHRONIC PAIN OF BOTH SHOULDERS: ICD-10-CM

## 2024-09-30 DIAGNOSIS — M25.561 CHRONIC PAIN OF RIGHT KNEE: ICD-10-CM

## 2024-09-30 DIAGNOSIS — M54.50 CHRONIC BILATERAL LOW BACK PAIN WITHOUT SCIATICA: ICD-10-CM

## 2024-09-30 DIAGNOSIS — M79.7 FIBROMYALGIA: ICD-10-CM

## 2024-09-30 DIAGNOSIS — G89.29 CHRONIC PAIN OF RIGHT KNEE: ICD-10-CM

## 2024-09-30 DIAGNOSIS — M54.6 PAIN IN THORACIC SPINE: ICD-10-CM

## 2024-09-30 DIAGNOSIS — M53.3 SACROILIAC JOINT DYSFUNCTION OF BOTH SIDES: ICD-10-CM

## 2024-09-30 DIAGNOSIS — M79.18 MYOFASCIAL PAIN SYNDROME: ICD-10-CM

## 2024-09-30 DIAGNOSIS — M25.511 CHRONIC PAIN OF BOTH SHOULDERS: ICD-10-CM

## 2024-09-30 DIAGNOSIS — G89.29 CHRONIC PAIN OF BOTH SHOULDERS: ICD-10-CM

## 2024-09-30 DIAGNOSIS — G89.29 CHRONIC BILATERAL LOW BACK PAIN WITHOUT SCIATICA: ICD-10-CM

## 2024-09-30 DIAGNOSIS — M54.2 CHRONIC NECK PAIN: ICD-10-CM

## 2024-09-30 DIAGNOSIS — G89.29 CHRONIC NECK PAIN: ICD-10-CM

## 2024-09-30 PROCEDURE — 97110 THERAPEUTIC EXERCISES: CPT | Mod: GP
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
## 2024-10-02 ENCOUNTER — APPOINTMENT (OUTPATIENT)
Dept: PHYSICAL THERAPY | Facility: CLINIC | Age: 59
End: 2024-10-02
Payer: COMMERCIAL

## 2024-10-04 ENCOUNTER — APPOINTMENT (OUTPATIENT)
Dept: PHYSICAL THERAPY | Facility: CLINIC | Age: 59
End: 2024-10-04
Payer: COMMERCIAL

## 2024-10-08 ENCOUNTER — APPOINTMENT (OUTPATIENT)
Dept: PHYSICAL THERAPY | Facility: CLINIC | Age: 59
End: 2024-10-08
Payer: COMMERCIAL

## 2024-10-09 ENCOUNTER — APPOINTMENT (OUTPATIENT)
Dept: PHYSICAL THERAPY | Facility: CLINIC | Age: 59
End: 2024-10-09
Payer: COMMERCIAL

## 2024-10-10 ENCOUNTER — APPOINTMENT (OUTPATIENT)
Dept: PHYSICAL THERAPY | Facility: CLINIC | Age: 59
End: 2024-10-10
Payer: COMMERCIAL

## 2024-10-12 ENCOUNTER — HOSPITAL ENCOUNTER (EMERGENCY)
Facility: HOSPITAL | Age: 59
Discharge: HOME | End: 2024-10-13
Attending: EMERGENCY MEDICINE
Payer: COMMERCIAL

## 2024-10-12 ENCOUNTER — APPOINTMENT (OUTPATIENT)
Dept: RADIOLOGY | Facility: HOSPITAL | Age: 59
End: 2024-10-12
Payer: COMMERCIAL

## 2024-10-12 DIAGNOSIS — G89.29 CHRONIC PAIN OF BOTH SHOULDERS: ICD-10-CM

## 2024-10-12 DIAGNOSIS — M25.512 CHRONIC PAIN OF BOTH SHOULDERS: ICD-10-CM
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
## 2024-10-12 DIAGNOSIS — S90.32XA CONTUSION OF LEFT FOOT INCLUDING TOES, INITIAL ENCOUNTER: ICD-10-CM

## 2024-10-12 DIAGNOSIS — S90.122A CONTUSION OF LEFT FOOT INCLUDING TOES, INITIAL ENCOUNTER: ICD-10-CM

## 2024-10-12 DIAGNOSIS — S90.129A CONTUSION OF TOE WITHOUT DAMAGE TO NAIL, UNSPECIFIED LATERALITY, UNSPECIFIED TOE, INITIAL ENCOUNTER: ICD-10-CM

## 2024-10-12 DIAGNOSIS — M25.511 CHRONIC PAIN OF BOTH SHOULDERS: ICD-10-CM

## 2024-10-12 PROCEDURE — 99284 EMERGENCY DEPT VISIT MOD MDM: CPT | Performed by: EMERGENCY MEDICINE

## 2024-10-12 PROCEDURE — 73630 X-RAY EXAM OF FOOT: CPT | Mod: LT

## 2024-10-12 PROCEDURE — 2500000001 HC RX 250 WO HCPCS SELF ADMINISTERED DRUGS (ALT 637 FOR MEDICARE OP): Performed by: EMERGENCY MEDICINE

## 2024-10-12 PROCEDURE — 96372 THER/PROPH/DIAG INJ SC/IM: CPT | Performed by: EMERGENCY MEDICINE

## 2024-10-12 PROCEDURE — 73630 X-RAY EXAM OF FOOT: CPT | Mod: LEFT SIDE | Performed by: RADIOLOGY

## 2024-10-12 PROCEDURE — 73030 X-RAY EXAM OF SHOULDER: CPT | Mod: BILATERAL PROCEDURE | Performed by: RADIOLOGY

## 2024-10-12 PROCEDURE — 73030 X-RAY EXAM OF SHOULDER: CPT | Mod: 50

## 2024-10-12 PROCEDURE — 2500000004 HC RX 250 GENERAL PHARMACY W/ HCPCS (ALT 636 FOR OP/ED): Performed by: EMERGENCY MEDICINE

## 2024-10-12 RX ORDER — KETOROLAC TROMETHAMINE 30 MG/ML
30 INJECTION, SOLUTION INTRAMUSCULAR; INTRAVENOUS ONCE
Status: DISCONTINUED | OUTPATIENT
Start: 2024-10-12 | End: 2024-10-12

## 2024-10-12 RX ORDER — CYCLOBENZAPRINE HCL 10 MG
10 TABLET ORAL ONCE
Status: COMPLETED | OUTPATIENT
Start: 2024-10-12 | End: 2024-10-12

## 2024-10-12 RX ORDER — KETOROLAC TROMETHAMINE 30 MG/ML
30 INJECTION, SOLUTION INTRAMUSCULAR; INTRAVENOUS ONCE
Status: COMPLETED | OUTPATIENT
Start: 2024-10-12 | End: 2024-10-12

## 2024-10-12 RX ORDER — METHOCARBAMOL 100 MG/ML
1000 INJECTION, SOLUTION INTRAMUSCULAR; INTRAVENOUS ONCE
Status: DISCONTINUED | OUTPATIENT
Start: 2024-10-12 | End: 2024-10-12

## 2024-10-12 RX ADMIN — CYCLOBENZAPRINE 10 MG: 10 TABLET, FILM COATED ORAL at 23:04

## 2024-10-12 RX ADMIN — KETOROLAC TROMETHAMINE 30 MG: 30 INJECTION, SOLUTION INTRAMUSCULAR at 23:04

## 2024-10-12 ASSESSMENT — PAIN SCALES - GENERAL
PAINLEVEL_OUTOF10: 10 - WORST POSSIBLE PAIN
PAINLEVEL_OUTOF10: 0 - NO PAIN
PAINLEVEL_OUTOF10: 7

## 2024-10-12 ASSESSMENT — PAIN - FUNCTIONAL ASSESSMENT
PAIN_FUNCTIONAL_ASSESSMENT: 0-10
PAIN_FUNCTIONAL_ASSESSMENT: 0-10

## 2024-10-12 ASSESSMENT — COLUMBIA-SUICIDE SEVERITY RATING SCALE - C-SSRS
1. IN THE PAST MONTH, HAVE YOU WISHED YOU WERE DEAD OR WISHED YOU COULD GO TO SLEEP AND NOT WAKE UP?: NO
6. HAVE YOU EVER DONE ANYTHING, STARTED TO DO ANYTHING, OR PREPARED TO DO ANYTHING TO END YOUR LIFE?: NO
2. HAVE YOU ACTUALLY HAD ANY THOUGHTS OF KILLING YOURSELF?: NO

## 2024-10-13 VITALS
TEMPERATURE: 97.7 F | RESPIRATION RATE: 16 BRPM | DIASTOLIC BLOOD PRESSURE: 74 MMHG | SYSTOLIC BLOOD PRESSURE: 102 MMHG | OXYGEN SATURATION: 97 % | HEART RATE: 72 BPM

## 2024-10-13 RX ORDER — ACETAMINOPHEN 325 MG/1
975 TABLET ORAL ONCE
Status: DISCONTINUED | OUTPATIENT
Start: 2024-10-13 | End: 2024-10-13 | Stop reason: HOSPADM

## 2024-10-13 RX ORDER — CYCLOBENZAPRINE HCL 10 MG
10 TABLET ORAL 3 TIMES DAILY PRN
Qty: 21 TABLET | Refills: 0 | Status: SHIPPED | OUTPATIENT
Start: 2024-10-13 | End: 2024-10-20

## 2024-10-13 RX ORDER — ACETAMINOPHEN 325 MG/1
TABLET ORAL
Status: DISCONTINUED
Start: 2024-10-13 | End: 2024-10-13 | Stop reason: HOSPADM

## 2024-10-13 NOTE — ED TRIAGE NOTES
Pt arrives via triage with complaint of falling out of bed this evening. Now has complaint of left foot pain, left shoulder pain, and abdominal pain. Denies LOC, denies hitting head, denies any blood thinners use. Also complaint of bilateral ankle pain. Denies CP/SOB.

## 2024-10-13 NOTE — ED PROVIDER NOTES
HPI   Chief Complaint   Patient presents with    Abdominal Pain    Fall    Shoulder Pain     Bilateral       HPI  The patient states that she sleeps in a fourposter bed 3 or 4 feet off the ground.  She has a history of chronic shoulder pain.  She says she was awake denies any loss consciousness but somehow rolled out of bed and injured her shoulders and her left foot.  She says she could not move her toes because of the pain.  She denies any head trauma or neck.  No new pain in the back.  She states that she did have some twisting pain in the abdomen but denies any direct trauma to the abdomen.      Patient History   Past Medical History:   Diagnosis Date    Acute atopic conjunctivitis, bilateral 2018    Allergic conjunctivitis of both eyes    Anxiety disorder, unspecified 2017    Anxiety    Bitten or stung by nonvenomous insect and other nonvenomous arthropods, initial encounter 2014    Multiple insect bites    Cellulitis of left toe 2017    Paronychia of great toe of left foot    Cellulitis of right toe 2017    Paronychia of great toe of right foot    Cellulitis of unspecified toe 2017    Inflammation of toenail    Complete or unspecified spontaneous  without complication ([facility])         Contusion of left forearm, initial encounter 10/12/2016    Contusion of left forearm, initial encounter    COVID-19 2022    COVID-19 virus infection    Dry eye syndrome of bilateral lacrimal glands 2019    Dry eyes, bilateral    Encounter for gynecological examination (general) (routine) without abnormal findings 2018    Encounter for annual routine gynecological examination    Encounter for gynecological examination (general) (routine) without abnormal findings 2016    Well woman exam    Encounter for screening for cardiovascular disorders 06/10/2019    Screening for cardiovascular condition    Encounter for screening for diabetes mellitus 2019     Screening for diabetes mellitus    Encounter for screening for other disorder 08/02/2019    Screening for nephropathy    Encounter for screening for other viral diseases 12/04/2015    Measles screening    Encounter for screening, unspecified 02/22/2019    Screening for condition    Foreign body in conjunctival sac, left eye, initial encounter 02/11/2020    Foreign body in conjunctival sac, left eye, initial encounter    Foreign body in conjunctival sac, right eye, initial encounter 02/11/2020    Foreign body in conjunctival sac, right eye, initial encounter    Ingrowing nail 08/09/2017    Ingrown right big toenail    Injury of conjunctiva and corneal abrasion without foreign body, right eye, initial encounter 06/28/2019    Abrasion of cornea, right    Injury of conjunctiva and corneal abrasion without foreign body, right eye, initial encounter 11/11/2019    Abrasion of right cornea, initial encounter    Localized edema 06/18/2018    Edema of both feet    Melena 04/09/2018    Blood in stool    Onycholysis 05/19/2017    Onycholysis of toenail    Other chest pain 04/03/2017    Feeling of chest tightness    Other chest pain 12/26/2019    Atypical chest pain    Other conditions influencing health status     History of pregnancy    Other conditions influencing health status 02/10/2022    History of cough    Other conditions influencing health status     Left handed    Other conditions influencing health status 10/24/2013    Postgastrectomy Dumping Syndrome    Pain in left knee 03/09/2016    Left knee pain, unspecified chronicity    Pain in right shoulder 11/03/2014    Pain in joint of right shoulder    Personal history of diseases of the skin and subcutaneous tissue 08/05/2021    History of contact dermatitis    Personal history of other benign neoplasm 07/23/2018    History of other benign neoplasm    Personal history of other diseases of the female genital tract     History of premenstrual syndrome    Personal history  of other diseases of the female genital tract 08/15/2013    History of menorrhagia    Personal history of other diseases of the musculoskeletal system and connective tissue 08/15/2013    History of backache    Personal history of other diseases of the respiratory system 04/24/2018    History of asthma    Personal history of other diseases of the respiratory system 10/31/2018    History of sinusitis    Personal history of other diseases of the respiratory system 04/24/2018    History of allergic rhinitis    Personal history of other endocrine, nutritional and metabolic disease 02/22/2019    History of obesity    Personal history of other endocrine, nutritional and metabolic disease 01/07/2019    History of hypokalemia    Personal history of other infectious and parasitic diseases     History of sexually transmitted disease    Personal history of other medical treatment     History of being hospitalized    Personal history of other medical treatment 10/24/2013    History of screening mammography    Personal history of other mental and behavioral disorders 04/24/2018    History of depression    Personal history of other mental and behavioral disorders 04/24/2018    History of anxiety disorder    Personal history of other specified conditions 07/12/2018    History of urinary incontinence    Personal history of other specified conditions 01/09/2020    History of fatigue    Personal history of other specified conditions 01/10/2021    History of dizziness    Personal history of other specified conditions 06/08/2015    History of shortness of breath    Personal history of other specified conditions 10/24/2013    History of abnormal weight loss    Personal history of other specified conditions 09/19/2017    History of palpitations    Personal history of other specified conditions 02/20/2018    History of abdominal pain    Personal history of other specified conditions 03/11/2022    History of edema    Pyogenic granuloma  2017    Pyogenic granuloma    Segmental and somatic dysfunction of cervical region 2018    Somatic dysfunction of cervical region    Segmental and somatic dysfunction of cervical region 11/10/2014    Somatic dysfunction of cervical region    Segmental and somatic dysfunction of rib cage 2014    Rib cage region somatic dysfunction    Segmental and somatic dysfunction of thoracic region 2018    Somatic dysfunction of thoracic region    Segmental and somatic dysfunction of thoracic region 11/10/2014    Somatic dysfunction of thoracic region    Segmental and somatic dysfunction of upper extremity 11/10/2014    Somatic dysfunction of upper extremities    Strain of muscle, fascia and tendon of unspecified hip, initial encounter 2017    Tear of gluteus minimus tendon    Syncope and collapse 2019    Near syncope    Twin pregnancy, unspecified number of placenta and unspecified number of amniotic sacs, unspecified trimester ([facility])     Twin pregnancy    Unspecified astigmatism, bilateral 2022    Astigmatism of both eyes    Unspecified astigmatism, unspecified eye 2019    Astigmatism with presbyopia    Unspecified disorder of synovium and tendon, unspecified shoulder 2014    Tendinopathy of rotator cuff    Unspecified injury of right lower leg, initial encounter 2017    Right knee injury     Past Surgical History:   Procedure Laterality Date     SECTION, CLASSIC  2014     Section    CT ANGIO CORONARY ART WITH HEARTFLOW IF SCORE >30%  2019    CT HEART CORONARY ANGIOGRAM 2019 [facility]GENCY LEGACY    GASTRIC BYPASS  08/15/2013    Gastric Surgery For Morbid Obesity Bypass With Raissa-en-Y    GASTRIC BYPASS  10/29/2021    Intestinal Surgery Raissa-en-Y    LAPAROSCOPY DIAGNOSTIC / BIOPSY / ASPIRATION / LYSIS  2014    Exploratory Laparoscopy    OTHER SURGICAL HISTORY  08/15/2013    Repair Of Traumatic Wound    OTHER SURGICAL HISTORY   2018    Surgically Induced  - By Dilation And Curettage    OTHER SURGICAL HISTORY  2014    Gynecologic Services Intrauterine Device (IUD) Insertion    SHOULDER OPEN ROTATOR CUFF REPAIR Left      Family History   Problem Relation Name Age of Onset    Other (cardiac disorder) Mother      Diabetes Mother      Hypertension Mother      Stroke Mother      Other (trauma) Father      Bone cancer Sister      Diabetes Sister      Epilepsy Sister      Hypertension Sister      Lung cancer Sister      Stroke Sister      Other (grand mal seizure) Sister      Breast cancer Sister      Autism Son      Other (allergic disorder) Child      Asthma Child      Eczema Child      Other (food allergy) Child      Other (sinus problem) Child      Cancer Mother's Sister      Cancer Maternal Cousin      Diabetes Other Grandmother      Social History     Tobacco Use    Smoking status: Never    Smokeless tobacco: Never   Vaping Use    Vaping status: Never Used   Substance Use Topics    Alcohol use: Yes     Alcohol/week: 1.0 standard drink of alcohol     Types: 1 Standard drinks or equivalent per week    Drug use: Never       Physical Exam   ED Triage Vitals [10/12/24 2115]   Temperature Heart Rate Respirations BP   36.5 °C (97.7 °F) 87 18 113/76      Pulse Ox Temp src Heart Rate Source Patient Position   99 % -- -- --      BP Location FiO2 (%)     -- --       Physical Exam  Vitals and nursing note reviewed.   Constitutional:       General: She is not in acute distress.     Appearance: She is well-developed.   HENT:      Head: Normocephalic and atraumatic.   Eyes:      Conjunctiva/sclera: Conjunctivae normal.   Cardiovascular:      Rate and Rhythm: Normal rate and regular rhythm.      Heart sounds: No murmur heard.  Pulmonary:      Effort: Pulmonary effort is normal. No respiratory distress.      Breath sounds: Normal breath sounds.   Abdominal:      Palpations: Abdomen is soft.      Tenderness: There is no abdominal  tenderness.   Musculoskeletal:         General: No swelling.      Cervical back: Neck supple.      Comments: Patient can lift up the arms but refuses to range of motion the toes, no obvious external signs of trauma.  Patient states that her legs feel swollen but no appreciable edema on my exam   Skin:     General: Skin is warm and dry.      Capillary Refill: Capillary refill takes less than 2 seconds.   Neurological:      Mental Status: She is alert.   Psychiatric:         Mood and Affect: Mood normal.           ED Course & MDM   Diagnoses as of 10/16/24 2242   Fall, initial encounter   Contusion of toe without damage to nail, unspecified laterality, unspecified toe, initial encounter   Contusion of left foot including toes, initial encounter   Chronic pain of both shoulders                 No data recorded     Chilo Coma Scale Score: 15 (10/12/24 2215 : Olivia Thorne RN)                           Medical Decision Making  Patient has negative x-rays of the toes.  Patient has negative x-rays of the shoulders for acute bony injury.  If there are some muscle sprain or strains this may be need to follow-up as an outpatient.  Patient already sees a shoulder doctor and pain management.  Without any fractures do not feel narcotics are indicated.  X-rays are negative.  The patient states that Toradol not improve her pain.  Given that there are no fractures we will have her follow-up with her respective outpatient doctors.    Procedure  Procedures     Boyd Solorzano MD  10/13/24 0636       Boyd Solorzano MD  10/16/24 3432

## 2024-10-13 NOTE — DISCHARGE INSTRUCTIONS
Talk to your pain management doctor and Dr. Aguilar about your pain control.  If the pain in your foot is not improved you may need to also follow-up with outpatient with a podiatrist.

## 2024-10-15 ENCOUNTER — APPOINTMENT (OUTPATIENT)
Dept: PHYSICAL THERAPY | Facility: CLINIC | Age: 59
End: 2024-10-15
Payer: COMMERCIAL

## 2024-10-15 ENCOUNTER — DOCUMENTATION (OUTPATIENT)
Dept: PHYSICAL THERAPY | Facility: CLINIC | Age: 59
End: 2024-10-15
Payer: COMMERCIAL

## 2024-10-15 NOTE — PROGRESS NOTES
Physical Therapy Discharge Summary      Name: Stephanie Gallardo  MRN: [id number]  : 1965  Date: 10/15/24    Current Problem:  [M25.511,G89.29,M25.512, M54.16, M54.6, M54.50,G89.29, M79.7, M54.2,G89.29, M53.3, M25.859, M25.561,G89.29, M79.18]    1. Chronic pain of both shoulders  Follow Up In Physical Therapy      2. Lumbar radiculitis  Follow Up In Physical Therapy      3. Pain in thoracic spine  Follow Up In Physical Therapy      4. Chronic bilateral low back pain without sciatica  Follow Up In Physical Therapy      5. Fibromyalgia  Follow Up In Physical Therapy      6. Chronic neck pain  Follow Up In Physical Therapy      7. Sacroiliac joint dysfunction of both sides  Follow Up In Physical Therapy      8. Femoroacetabular impingement  Follow Up In Physical Therapy      9. Chronic pain of right knee  Follow Up In Physical Therapy      10. Myofascial pain syndrome  Follow Up In Physical Therapy           Discharge Summary: PT    Discharge Information: Date of evaluation 9/10/24, Date of last visit 24,  Number of attended visits 2, 7 cancels between 24, 10-2, 10-4, 10-8, 10-9, 10-10-24, and 10-15-24, Date of discharge 10/15/24    Rehab Discharge Reason: Failed to schedule and/or keep follow-up appointment(s)      Discharge Status:  Pt was called on 10-15-24 at 13:45 to explain to her that she is being discharged, and the remaining visits will also be cancelled due to policy.      Rehab Discharge Reason: Attendance inconsistent, Failed to keep follow-up appointment(s).  PT had 6 cancels during her time spent in PT.       Due to  policy and procedures, which were reviewed with her on the date of eval, 9-10-24, she will be terminated from services at our [facility] department.    Policy 5. States if a patient accumulates any combination of 3 cancellations and no-shows, any remaining appointments may be canceled & the patient may forfeit their regular slot.

## 2024-10-16 ENCOUNTER — APPOINTMENT (OUTPATIENT)
Dept: RADIOLOGY | Facility: CLINIC | Age: 59
End: 2024-10-16
Payer: COMMERCIAL

## 2024-10-17 ENCOUNTER — APPOINTMENT (OUTPATIENT)
Dept: PHYSICAL THERAPY | Facility: CLINIC | Age: 59
End: 2024-10-17
Payer: COMMERCIAL

## 2024-10-18 ENCOUNTER — TELEPHONE (OUTPATIENT)
Dept: PHYSICAL MEDICINE AND REHAB | Facility: CLINIC | Age: 59
End: 2024-10-18
Payer: COMMERCIAL

## 2024-10-22 ENCOUNTER — OFFICE VISIT (OUTPATIENT)
Dept: ORTHOPEDIC SURGERY | Facility: HOSPITAL | Age: 59
End: 2024-10-22
Payer: COMMERCIAL

## 2024-10-22 DIAGNOSIS — G89.29 CHRONIC RIGHT SHOULDER PAIN: Primary | ICD-10-CM

## 2024-10-22 DIAGNOSIS — M25.511 CHRONIC RIGHT SHOULDER PAIN: Primary | ICD-10-CM

## 2024-10-22 DIAGNOSIS — Z98.890 STATUS POST ARTHROSCOPY OF LEFT SHOULDER: ICD-10-CM

## 2024-10-22 PROCEDURE — 2500000004 HC RX 250 GENERAL PHARMACY W/ HCPCS (ALT 636 FOR OP/ED): Performed by: ORTHOPAEDIC SURGERY

## 2024-10-22 PROCEDURE — 20610 DRAIN/INJ JOINT/BURSA W/O US: CPT | Mod: RT | Performed by: ORTHOPAEDIC SURGERY

## 2024-10-22 PROCEDURE — 99213 OFFICE O/P EST LOW 20 MIN: CPT | Performed by: ORTHOPAEDIC SURGERY

## 2024-10-22 PROCEDURE — 20610 DRAIN/INJ JOINT/BURSA W/O US: CPT | Mod: LT | Performed by: ORTHOPAEDIC SURGERY

## 2024-10-23 ENCOUNTER — APPOINTMENT (OUTPATIENT)
Dept: PHYSICAL THERAPY | Facility: CLINIC | Age: 59
End: 2024-10-23
Payer: COMMERCIAL

## 2024-10-25 ENCOUNTER — APPOINTMENT (OUTPATIENT)
Dept: PHYSICAL THERAPY | Facility: CLINIC | Age: 59
End: 2024-10-25
Payer: COMMERCIAL

## 2024-10-28 ENCOUNTER — OFFICE VISIT (OUTPATIENT)
Dept: PHYSICAL MEDICINE AND REHAB | Facility: CLINIC | Age: 59
End: 2024-10-28
Payer: COMMERCIAL

## 2024-10-28 ENCOUNTER — APPOINTMENT (OUTPATIENT)
Dept: ALLERGY | Facility: CLINIC | Age: 59
End: 2024-10-28
Payer: COMMERCIAL

## 2024-10-28 VITALS
WEIGHT: 215 LBS | DIASTOLIC BLOOD PRESSURE: 82 MMHG | BODY MASS INDEX: 35.82 KG/M2 | SYSTOLIC BLOOD PRESSURE: 104 MMHG | HEIGHT: 65 IN | OXYGEN SATURATION: 100 % | HEART RATE: 92 BPM

## 2024-10-28 VITALS
WEIGHT: 217 LBS | DIASTOLIC BLOOD PRESSURE: 68 MMHG | HEIGHT: 65 IN | TEMPERATURE: 97 F | HEART RATE: 74 BPM | BODY MASS INDEX: 36.15 KG/M2 | OXYGEN SATURATION: 99 % | SYSTOLIC BLOOD PRESSURE: 106 MMHG

## 2024-10-28 DIAGNOSIS — G89.29 CHRONIC BILATERAL LOW BACK PAIN WITHOUT SCIATICA: ICD-10-CM

## 2024-10-28 DIAGNOSIS — M53.3 SACROILIAC JOINT DYSFUNCTION OF BOTH SIDES: ICD-10-CM

## 2024-10-28 DIAGNOSIS — G89.29 CHRONIC PAIN OF RIGHT KNEE: ICD-10-CM

## 2024-10-28 DIAGNOSIS — J30.1 SEASONAL ALLERGIC RHINITIS DUE TO POLLEN: Primary | ICD-10-CM

## 2024-10-28 DIAGNOSIS — G89.29 CHRONIC PAIN OF BOTH SHOULDERS: ICD-10-CM

## 2024-10-28 DIAGNOSIS — G89.29 CHRONIC NECK PAIN: ICD-10-CM

## 2024-10-28 DIAGNOSIS — M79.7 FIBROMYALGIA: ICD-10-CM

## 2024-10-28 DIAGNOSIS — R60.1 GENERALIZED EDEMA: ICD-10-CM

## 2024-10-28 DIAGNOSIS — G89.29 CHRONIC PAIN OF BOTH KNEES: ICD-10-CM

## 2024-10-28 DIAGNOSIS — M25.562 CHRONIC PAIN OF BOTH KNEES: ICD-10-CM

## 2024-10-28 DIAGNOSIS — M54.6 PAIN IN THORACIC SPINE: ICD-10-CM

## 2024-10-28 DIAGNOSIS — M25.562 CHRONIC PAIN OF LEFT KNEE: ICD-10-CM

## 2024-10-28 DIAGNOSIS — J30.81 ALLERGIC RHINITIS DUE TO ANIMAL HAIR AND DANDER: ICD-10-CM

## 2024-10-28 DIAGNOSIS — M79.18 MYOFASCIAL PAIN SYNDROME: Primary | ICD-10-CM

## 2024-10-28 DIAGNOSIS — G89.29 CHRONIC PAIN OF LEFT KNEE: ICD-10-CM

## 2024-10-28 DIAGNOSIS — M25.561 CHRONIC PAIN OF BOTH KNEES: ICD-10-CM

## 2024-10-28 DIAGNOSIS — M25.551 BILATERAL HIP PAIN: ICD-10-CM

## 2024-10-28 DIAGNOSIS — M54.50 CHRONIC BILATERAL LOW BACK PAIN WITHOUT SCIATICA: ICD-10-CM

## 2024-10-28 DIAGNOSIS — J30.89 ALLERGIC RHINITIS DUE TO DUST MITE: ICD-10-CM

## 2024-10-28 DIAGNOSIS — M54.16 LUMBAR RADICULITIS: ICD-10-CM

## 2024-10-28 DIAGNOSIS — M25.511 CHRONIC PAIN OF BOTH SHOULDERS: ICD-10-CM

## 2024-10-28 DIAGNOSIS — M25.859 FEMOROACETABULAR IMPINGEMENT: ICD-10-CM

## 2024-10-28 DIAGNOSIS — M54.2 CHRONIC NECK PAIN: ICD-10-CM

## 2024-10-28 DIAGNOSIS — E55.9 VITAMIN D DEFICIENCY: ICD-10-CM

## 2024-10-28 DIAGNOSIS — M25.561 CHRONIC PAIN OF RIGHT KNEE: ICD-10-CM

## 2024-10-28 DIAGNOSIS — G47.33 OBSTRUCTIVE SLEEP APNEA: ICD-10-CM

## 2024-10-28 DIAGNOSIS — M25.552 BILATERAL HIP PAIN: ICD-10-CM

## 2024-10-28 DIAGNOSIS — M25.512 CHRONIC PAIN OF BOTH SHOULDERS: ICD-10-CM

## 2024-10-28 PROBLEM — Z09 ENCOUNTER FOR FOLLOW-UP EXAMINATION AFTER COMPLETED TREATMENT FOR CONDITIONS OTHER THAN MALIGNANT NEOPLASM: Status: ACTIVE | Noted: 2024-10-28

## 2024-10-28 PROBLEM — B02.23 SHINGLES (HERPES ZOSTER) POLYNEUROPATHY: Status: ACTIVE | Noted: 2024-10-28

## 2024-10-28 PROCEDURE — 99214 OFFICE O/P EST MOD 30 MIN: CPT | Performed by: PHYSICAL MEDICINE & REHABILITATION

## 2024-10-28 PROCEDURE — 99214 OFFICE O/P EST MOD 30 MIN: CPT | Performed by: ALLERGY & IMMUNOLOGY

## 2024-10-28 PROCEDURE — 1036F TOBACCO NON-USER: CPT | Performed by: ALLERGY & IMMUNOLOGY

## 2024-10-28 PROCEDURE — 20553 NJX 1/MLT TRIGGER POINTS 3/>: CPT | Performed by: PHYSICAL MEDICINE & REHABILITATION

## 2024-10-28 PROCEDURE — 3008F BODY MASS INDEX DOCD: CPT | Performed by: PHYSICAL MEDICINE & REHABILITATION

## 2024-10-28 PROCEDURE — 3008F BODY MASS INDEX DOCD: CPT | Performed by: ALLERGY & IMMUNOLOGY

## 2024-10-28 RX ORDER — BECLOMETHASONE DIPROPIONATE 80 UG/1
2 AEROSOL, METERED NASAL DAILY
Qty: 8.7 G | Refills: 11 | Status: SHIPPED | OUTPATIENT
Start: 2024-10-28

## 2024-10-28 RX ORDER — LIDOCAINE HYDROCHLORIDE 10 MG/ML
4 INJECTION, SOLUTION INFILTRATION; PERINEURAL
Status: COMPLETED | OUTPATIENT
Start: 2024-10-22 | End: 2024-10-22

## 2024-10-28 RX ORDER — AZELASTINE 1 MG/ML
2 SPRAY, METERED NASAL 2 TIMES DAILY PRN
Qty: 30 ML | Refills: 11 | Status: SHIPPED | OUTPATIENT
Start: 2024-10-28

## 2024-10-28 RX ORDER — TRIAMCINOLONE ACETONIDE 40 MG/ML
40 INJECTION, SUSPENSION INTRA-ARTICULAR; INTRAMUSCULAR
Status: COMPLETED | OUTPATIENT
Start: 2024-10-22 | End: 2024-10-22

## 2024-10-28 RX ORDER — LEVOCETIRIZINE DIHYDROCHLORIDE 5 MG/1
5 TABLET, FILM COATED ORAL DAILY
Qty: 30 TABLET | Refills: 11 | Status: SHIPPED | OUTPATIENT
Start: 2024-10-28 | End: 2025-10-28

## 2024-10-28 ASSESSMENT — PAIN SCALES - GENERAL: PAINLEVEL_OUTOF10: 10-WORST PAIN EVER

## 2024-10-28 ASSESSMENT — ENCOUNTER SYMPTOMS
GASTROINTESTINAL NEGATIVE: 1
MUSCULOSKELETAL NEGATIVE: 1
RESPIRATORY NEGATIVE: 1
CONSTITUTIONAL NEGATIVE: 1
EYES NEGATIVE: 1
HEMATOLOGIC/LYMPHATIC NEGATIVE: 1
ALLERGIC/IMMUNOLOGIC NEGATIVE: 1
CARDIOVASCULAR NEGATIVE: 1

## 2024-10-31 DIAGNOSIS — M25.561 CHRONIC PAIN OF RIGHT KNEE: Primary | ICD-10-CM

## 2024-10-31 DIAGNOSIS — G89.29 CHRONIC PAIN OF RIGHT KNEE: Primary | ICD-10-CM

## 2024-11-01 ENCOUNTER — TELEPHONE (OUTPATIENT)
Dept: PHYSICAL MEDICINE AND REHAB | Facility: CLINIC | Age: 59
End: 2024-11-01

## 2024-11-01 ENCOUNTER — APPOINTMENT (OUTPATIENT)
Dept: OPHTHALMOLOGY | Facility: CLINIC | Age: 59
End: 2024-11-01
Payer: MEDICARE

## 2024-11-04 ENCOUNTER — APPOINTMENT (OUTPATIENT)
Dept: PHYSICAL THERAPY | Facility: CLINIC | Age: 59
End: 2024-11-04
Payer: COMMERCIAL

## 2024-11-11 ENCOUNTER — TELEPHONE (OUTPATIENT)
Dept: PHYSICAL MEDICINE AND REHAB | Facility: CLINIC | Age: 59
End: 2024-11-11
Payer: COMMERCIAL

## 2024-11-11 DIAGNOSIS — Z18.10 RETAINED METAL FRAGMENT: Primary | ICD-10-CM
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
## 2024-11-18 ENCOUNTER — APPOINTMENT (OUTPATIENT)
Dept: PHYSICAL MEDICINE AND REHAB | Facility: CLINIC | Age: 59
End: 2024-11-18
Payer: COMMERCIAL

## 2024-11-19 ENCOUNTER — APPOINTMENT (OUTPATIENT)
Dept: ORTHOPEDIC SURGERY | Facility: HOSPITAL | Age: 59
End: 2024-11-19
Payer: COMMERCIAL

## 2024-11-20 ENCOUNTER — APPOINTMENT (OUTPATIENT)
Dept: OPHTHALMOLOGY | Facility: CLINIC | Age: 59
End: 2024-11-20
Payer: COMMERCIAL

## 2024-11-25 ENCOUNTER — SPECIALTY PHARMACY (OUTPATIENT)
Dept: PHARMACY | Facility: CLINIC | Age: 59
End: 2024-11-25

## 2024-11-26 ENCOUNTER — TELEPHONE (OUTPATIENT)
Dept: ALLERGY | Facility: CLINIC | Age: 59
End: 2024-11-26
Payer: COMMERCIAL

## 2024-11-26 NOTE — TELEPHONE ENCOUNTER
----- Message from Iva TOUSSAINT sent at 11/26/2024 11:11 AM EST -----  Regarding: RE:  Specialty Pharmacy / Coverage Approval  My pleasure!  ----- Message -----  From: Primitivo Gomez, SAMIRA  Sent: 11/26/2024  10:50 AM EST  To: May Tabares PharmD; Iva Ruiz  Subject: RE:  Specialty Pharmacy / Coverage Approval    Iva,     Thank you! This is really great!  -Primitivo  ----- Message -----  From: Iva Ruiz  Sent: 11/26/2024  10:40 AM EST  To: May Tabares PharmD; Primitivo Gomez RN  Subject:  Specialty Pharmacy / Coverage Approval        Hello,    Coverage has been approved for this patient's Qnasl from 08/28/2024 to 11/26/2025.  The approval notice from the insurance plan has been added to the Media tab.  A test claim resulted in a $0 copay.     Thank you,    Iva Ruiz, [facility]  Patient Support [facility], Asthma/Allergy/Cystic Fibrosis  [address] [place] [address], [place] [address]  Phone: [phone]  Fax: [phone]  Email: [email or web address]

## 2024-11-26 NOTE — TELEPHONE ENCOUNTER
Called and spoke to pharmacy tech. Provided details that nasal spray has an approval for qnasl. Pharmacy tech stated she will try to fill for afternoon and call patient once filled.

## 2024-12-09 ENCOUNTER — APPOINTMENT (OUTPATIENT)
Dept: RADIOLOGY | Facility: HOSPITAL | Age: 59
End: 2024-12-09
Payer: COMMERCIAL

## 2024-12-10 ENCOUNTER — TELEPHONE (OUTPATIENT)
Dept: PHYSICAL MEDICINE AND REHAB | Facility: CLINIC | Age: 59
End: 2024-12-10
Payer: COMMERCIAL
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
## 2024-12-11 DIAGNOSIS — M25.562 CHRONIC PAIN OF BOTH KNEES: ICD-10-CM

## 2024-12-11 DIAGNOSIS — M54.6 PAIN IN THORACIC SPINE: ICD-10-CM

## 2024-12-11 DIAGNOSIS — M25.511 CHRONIC PAIN OF BOTH SHOULDERS: ICD-10-CM

## 2024-12-11 DIAGNOSIS — M25.859 FEMOROACETABULAR IMPINGEMENT: ICD-10-CM

## 2024-12-11 DIAGNOSIS — M25.552 BILATERAL HIP PAIN: Primary | ICD-10-CM

## 2024-12-11 DIAGNOSIS — M25.561 CHRONIC PAIN OF BOTH KNEES: ICD-10-CM

## 2024-12-11 DIAGNOSIS — G89.29 CHRONIC BILATERAL LOW BACK PAIN WITHOUT SCIATICA: ICD-10-CM

## 2024-12-11 DIAGNOSIS — M54.16 LUMBAR RADICULITIS: ICD-10-CM

## 2024-12-11 DIAGNOSIS — G89.29 CHRONIC PAIN OF BOTH KNEES: ICD-10-CM

## 2024-12-11 DIAGNOSIS — M25.551 BILATERAL HIP PAIN: Primary | ICD-10-CM

## 2024-12-11 DIAGNOSIS — M53.3 SACROILIAC JOINT DYSFUNCTION OF BOTH SIDES: ICD-10-CM

## 2024-12-11 DIAGNOSIS — G89.29 CHRONIC PAIN OF BOTH SHOULDERS: ICD-10-CM

## 2024-12-11 DIAGNOSIS — M25.512 CHRONIC PAIN OF BOTH SHOULDERS: ICD-10-CM

## 2024-12-11 DIAGNOSIS — M54.50 CHRONIC BILATERAL LOW BACK PAIN WITHOUT SCIATICA: ICD-10-CM

## 2024-12-11 DIAGNOSIS — M79.18 MYOFASCIAL PAIN SYNDROME: ICD-10-CM

## 2024-12-11 DIAGNOSIS — M54.2 CHRONIC NECK PAIN: ICD-10-CM

## 2024-12-11 DIAGNOSIS — G89.29 CHRONIC NECK PAIN: ICD-10-CM

## 2024-12-12 ENCOUNTER — APPOINTMENT (OUTPATIENT)
Dept: OPHTHALMOLOGY | Facility: CLINIC | Age: 59
End: 2024-12-12
Payer: COMMERCIAL

## 2024-12-18 ENCOUNTER — TELEPHONE (OUTPATIENT)
Dept: PHYSICAL MEDICINE AND REHAB | Facility: CLINIC | Age: 59
End: 2024-12-18
Payer: COMMERCIAL

## 2024-12-18 ENCOUNTER — APPOINTMENT (OUTPATIENT)
Dept: GASTROENTEROLOGY | Facility: CLINIC | Age: 59
End: 2024-12-18
Payer: COMMERCIAL

## 2024-12-18 NOTE — TELEPHONE ENCOUNTER
"Pt calling  She is very concerned about a large \"bulge of flesh\" which is painful on her hip because her sister  of cancer.  She states she has had MRI previously in  due to swelling and pain and is asking if cancer would be seen on an MRI?  If not, who should she see to rule out cancer?  "

## 2024-12-20 ENCOUNTER — TELEPHONE (OUTPATIENT)
Dept: PHYSICAL MEDICINE AND REHAB | Facility: CLINIC | Age: 59
End: 2024-12-20
Payer: COMMERCIAL

## 2024-12-23 NOTE — TELEPHONE ENCOUNTER
Spoke with pt, offered 12/27, 12/30, 12/31, 1/9- pt unable to make any of these.  She will keep her 2/3/25 appt

## 2025-01-02 ENCOUNTER — APPOINTMENT (OUTPATIENT)
Dept: PAIN MEDICINE | Facility: HOSPITAL | Age: 60
End: 2025-01-02
Payer: COMMERCIAL

## 2025-01-08 ENCOUNTER — APPOINTMENT (OUTPATIENT)
Dept: ORTHOPEDIC SURGERY | Facility: CLINIC | Age: 60
End: 2025-01-08
Payer: COMMERCIAL

## 2025-01-08 DIAGNOSIS — M25.511 BILATERAL SHOULDER PAIN, UNSPECIFIED CHRONICITY: Primary | ICD-10-CM

## 2025-01-08 DIAGNOSIS — M25.512 BILATERAL SHOULDER PAIN, UNSPECIFIED CHRONICITY: Primary | ICD-10-CM

## 2025-01-21 ENCOUNTER — APPOINTMENT (OUTPATIENT)
Dept: ORTHOPEDIC SURGERY | Facility: HOSPITAL | Age: 60
End: 2025-01-21
Payer: COMMERCIAL

## 2025-01-28 ENCOUNTER — APPOINTMENT (OUTPATIENT)
Dept: ORTHOPEDIC SURGERY | Facility: HOSPITAL | Age: 60
End: 2025-01-28
Payer: COMMERCIAL

## 2025-01-30 ENCOUNTER — OFFICE VISIT (OUTPATIENT)
Dept: PAIN MEDICINE | Facility: HOSPITAL | Age: 60
End: 2025-01-30
Payer: COMMERCIAL

## 2025-01-30 DIAGNOSIS — M54.16 LUMBAR RADICULOPATHY: ICD-10-CM

## 2025-01-30 PROCEDURE — 99213 OFFICE O/P EST LOW 20 MIN: CPT | Performed by: ANESTHESIOLOGY

## 2025-01-30 ASSESSMENT — PAIN SCALES - GENERAL: PAINLEVEL_OUTOF10: 7

## 2025-02-03 ENCOUNTER — APPOINTMENT (OUTPATIENT)
Dept: PHYSICAL MEDICINE AND REHAB | Facility: CLINIC | Age: 60
End: 2025-02-03
Payer: COMMERCIAL

## 2025-02-03 ENCOUNTER — TELEPHONE (OUTPATIENT)
Dept: PHYSICAL MEDICINE AND REHAB | Facility: CLINIC | Age: 60
End: 2025-02-03

## 2025-02-03 NOTE — PATIENT INSTRUCTIONS
-Ice on and off for the next 24 hours if injection sites are sore. Do gentle range of motion exercises. Try to do them every hour for about half a minute or so, in every direction that the affected part goes. Avoid heavy lifting for the next 2 days.   -Increase Cymbalta to 60 mg  -consider gabapentin again, Lyrica in the future   -Continue Jhonatan chi, pilates, home exercises, PT.   -Continue with acupuncture as needed  -Proceed with sleep medicine physician  -Follow up with Broderick Horton to discuss L5-S1 epidural steroid injections if you want  -Continue OMT as needed  -left knee MRI ordered  -DEXA scan ordered previously  -Follow up as needed, you can message me on my chart
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
## 2025-02-03 NOTE — TELEPHONE ENCOUNTER
Per Dr. Cr (secure chat message) patient late canceled or no showed 2x and asked me to advise the patient per our no show/ late cancellation policy she will be given 1 more opportunity.  One more will result in termination from the practice.    7/22/24 Same day cancel serels- sick  2/3/25 A Shaye late cancel    I LVM for pt to return our call
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
## 2025-02-09 NOTE — PROGRESS NOTES
Subjective    Patient ID: Stephanie Gallardo is a 59 y.o. female.    Chief Complaint: Bilateral shoulder pain R > L     Last Surgery: Left arthroscopic rotator cuff repair with arthroscopic biceps tenodesis.   Last Surgery Date: 08/25/23    HPI  She continues to struggle a little bit with her function.  Her other shoulder is also bothering her.  She is sleeping a little bit better.  She is early in the recovery.  She is doing therapy and seems to think that that is helping.    05/14/24  Stephanie returns to the clinic today for a repeat follow up visit regarding her bilateral shoulders.    She reports she still struggles with her left shoulder. Putting on her bra is very difficult.     12/22/24  Stephanie Gallardo is a 59 y.o. female returning to the clinic for a follow up visit regarding her left shoulder.    She explains that the injections in May provided good relief and she was able to make progress in physical therapy. She then got sick and missed physical therapy appointments. She has since stopped her physical therapy. She fell on 10/19 and has had worsening pain on the left side.     2/8/25  Stephanie Gallardo is a 59 y.o. female returning to the clinic for a follow up visit regarding her left shoulder/elbow.     Objective   Patient is a well-developed, well-nourished female in no acute distress.  Breathes with normal chest rises.  Pupils are round and symmetric today.  Awake, alert, and oriented x3.      Examination of the right shoulder today reveals the skin to be intact. There is no sign of any atrophy, lesions, or abrasions. There is no pain to palpation of the bony prominences. Some ecchymosis. Cervical lymphadenopathy examined, and this was negative. Patient had 5 out of 5 wrist flexion, extension, and thumb extension, bilaterally. Sensation was intact to light touch to median, ulnar, radial, axillary, and musculocutaneous nerves bilaterally. Positive radial pulse bilaterally.   Range of  motion of the right shoulder revealed 0-170° of forward elevation, 0-60° of external rotation, and internal rotation up to T-12.  Positive Camarena sign positive Neer sign. 3/5 Luan's    Examination of the left shoulder reveals well healed surgical incisions. Minimal swelling. No warmth or erythema. No ecchymosis. Sutures were removed today and steris strips placed on incision sites on top of shoulder. Neurovascularly intact distally. 5 out of 5 wrist, hand and thumb flexion and extension without pain. Full active range of motion of elbow.  50 degrees of forward elevation 13 degrees passively of forward elevation. 20degrees external rotation. 3/5 Luan's testing. Positive Hawkin's.    Image Results:        Assessment/Plan   Encounter Diagnoses:  No diagnosis found.  FELA is 9 months status post left arthroscopic rotator cuff repair, decompression, debridement and arthroscopic biceps tenodesis on 8/25/23.     We did bilateral shoulder injections for her today as she is in pain. She has some weakness on the right side after her fall.  We updated her physical therapy script. We will see her back in 10 weeks. If she finds no relief she will let us know and we will obtain an MRI of one of her shoulders.   No orders of the defined types were placed in this encounter.    Follow up in 3 month    Scribe Attestation  By signing my name below, Linda MOON Scribe   attest that this documentation has been prepared under the direction and in the presence of Octavio Tompkins MD.

## 2025-02-11 ENCOUNTER — APPOINTMENT (OUTPATIENT)
Dept: ORTHOPEDIC SURGERY | Facility: HOSPITAL | Age: 60
End: 2025-02-11
Payer: COMMERCIAL

## 2025-02-17 ENCOUNTER — APPOINTMENT (OUTPATIENT)
Dept: PHYSICAL MEDICINE AND REHAB | Facility: CLINIC | Age: 60
End: 2025-02-17
Payer: COMMERCIAL

## 2025-02-17 NOTE — PATIENT INSTRUCTIONS
-Ice on and off for the next 24 hours if injection sites are sore. Do gentle range of motion exercises. Try to do them every hour for about half a minute or so, in every direction that the affected part goes. Avoid heavy lifting for the next 2 days.   -Continue Cymbalta to 60 mg  -Increase gabapentin slowly by 300 mg at a time every 5-7 days until you are at 600 mg three times per day. Maximum is 1200 mg three times per day  -consider topamax, Lyrica in the future   -Continue Jhonatan chi, pilates, home exercises, PT.   -Continue with acupuncture and OMT as needed  -Follow up with Broderick Horton to discuss L5-S1 epidural steroid injections if you want  -bilateral knee MRIs ordered previously  -DEXA scan ordered previously  -Referral provided to Dr. Hoang to discuss PRP vs prolotherapy  -Follow up after MRI's
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
## 2025-02-24 ENCOUNTER — APPOINTMENT (OUTPATIENT)
Facility: HOSPITAL | Age: 60
End: 2025-02-24
Payer: COMMERCIAL

## 2025-02-26 DIAGNOSIS — E03.9 HYPOTHYROIDISM, UNSPECIFIED TYPE: ICD-10-CM

## 2025-02-26 RX ORDER — LEVOTHYROXINE SODIUM 75 UG/1
75 TABLET ORAL DAILY
Qty: 30 TABLET | Refills: 0 | OUTPATIENT
Start: 2025-02-26

## 2025-03-06 ENCOUNTER — TELEPHONE (OUTPATIENT)
Dept: ALLERGY | Facility: CLINIC | Age: 60
End: 2025-03-06
Payer: COMMERCIAL

## 2025-03-10 ENCOUNTER — APPOINTMENT (OUTPATIENT)
Dept: ALLERGY | Facility: CLINIC | Age: 60
End: 2025-03-10
Payer: COMMERCIAL

## 2025-03-10 VITALS
BODY MASS INDEX: 36.15 KG/M2 | TEMPERATURE: 98.1 F | HEART RATE: 78 BPM | SYSTOLIC BLOOD PRESSURE: 106 MMHG | WEIGHT: 217 LBS | HEIGHT: 65 IN | DIASTOLIC BLOOD PRESSURE: 74 MMHG

## 2025-03-10 DIAGNOSIS — J30.1 SEASONAL ALLERGIC RHINITIS DUE TO POLLEN: Primary | ICD-10-CM

## 2025-03-10 DIAGNOSIS — J30.89 ALLERGIC RHINITIS DUE TO DUST MITE: ICD-10-CM

## 2025-03-10 DIAGNOSIS — J30.81 ALLERGIC RHINITIS DUE TO ANIMAL HAIR AND DANDER: ICD-10-CM

## 2025-03-10 PROCEDURE — 99213 OFFICE O/P EST LOW 20 MIN: CPT | Performed by: ALLERGY & IMMUNOLOGY

## 2025-03-10 PROCEDURE — 3008F BODY MASS INDEX DOCD: CPT | Performed by: ALLERGY & IMMUNOLOGY

## 2025-03-10 RX ORDER — OLOPATADINE HYDROCHLORIDE 2 MG/ML
1 SOLUTION/ DROPS OPHTHALMIC DAILY PRN
Qty: 2.5 ML | Refills: 5 | Status: SHIPPED | OUTPATIENT
Start: 2025-03-10 | End: 2026-03-10

## 2025-03-10 ASSESSMENT — ENCOUNTER SYMPTOMS
HEMATOLOGIC/LYMPHATIC NEGATIVE: 1
EYES NEGATIVE: 1
MUSCULOSKELETAL NEGATIVE: 1
CARDIOVASCULAR NEGATIVE: 1
CONSTITUTIONAL NEGATIVE: 1
GASTROINTESTINAL NEGATIVE: 1
ALLERGIC/IMMUNOLOGIC NEGATIVE: 1
RESPIRATORY NEGATIVE: 1

## 2025-03-10 NOTE — PROGRESS NOTES
"Stephanie Ferrell Gallardo presents for follow up evaluation today.  She states that overall allergy symptoms are well-controlled and the Qnasl has controlled symptoms more than prior nasal sprays.  She continues on levocetirizine 5 mg once daily as needed as well.  She has not started Astelin.  She notes that she has had more dry eye lately and Restasis has not been helpful.  She has been out of her thyroid medication for 1 month.    Review of Systems   Constitutional: Negative.    HENT: Negative.     Eyes: Negative.         Dry eye   Respiratory: Negative.     Cardiovascular: Negative.    Gastrointestinal: Negative.    Musculoskeletal: Negative.    Skin: Negative.    Allergic/Immunologic: Negative.    Hematological: Negative.      Vital signs:  /74 (BP Location: Right arm, Patient Position: Sitting, BP Cuff Size: Adult)   Pulse 78   Temp 36.7 °C (98.1 °F)   Ht 1.651 m (5' 5\")   Wt 98.4 kg (217 lb) Comment: confirmed with patient  BMI 36.11 kg/m²       GENERAL: Alert, oriented and in no acute distress.     HEENT: EYES: No conjunctival injection or cobblestoning. Nose: nasal turbinates are not edematous and are not boggy.  There is no mucous stranding, polyps, or blood noted. EARS: Tympanic membranes are clear. MOUTH: moist and pink with no exudates, ulcers, or thrush. NECK: No upper airway stridor noted.       HEART: regular rate and rhythm.       LUNGS: Clear to auscultation bilaterally. No wheezing, rhonchi or rales.        ABDOMEN: Positive bowel sounds, soft, nontender, nondistended.       EXTREMITIES: No clubbing or edema.        NEURO:  Normal affect.  Gait normal.      SKIN: No rash, hives, or angioedema noted    Impression:   1. Seasonal allergic rhinitis due to pollen    2. Allergic rhinitis due to animal hair and dander    3. Allergic rhinitis due to dust mite      Assessment and plan:  Allergic rhinitis, seasonal and perennial: Stephanie has a history of allergic rhinitis with sensitivity to tree " pollen, grass pollen, weed pollen, dust mite, cat, dog, and mold on prior aeroallergen skin prick testing with Dr. Roberson (2018). Continue QNASL 2 puffs each nostril daily, may use Astelin 2 sprays each nostril up to twice daily, and levocetirizine 5 mg daily.  If symptoms are not better controlled, she will let us know.     Allergic conjunctivitis, bilateral; chronic dry eye: May use olopatadine 0.2% eyedrops 1 drop each eye daily as needed if symptomatic from pollen exposure, however cautioned that could worsen dry eye.  She will touch base with eye doctor to determine if there are other dry eye therapies.    Vocal cord polyps: Following with ENT, stable      Plan for follow-up in 6 months or sooner if needed

## 2025-03-10 NOTE — PATIENT INSTRUCTIONS
It was nice to see you today     Use QNASL 2 puffs each nostril once daily    You may use Astelin (Azelastine) 2 sprays each nostril in the evening.  You may use this twice daily if helpful.  This medication tastes bitter and can make you sleepy    You may use levocetirizine (Xyzal) 5 mg once daily as needed    You may use olopatadine 0.2% eyedrops 1 drop each eye daily as needed    You can try a HEPA air purifier to improve air quality in the house     Our nurse line phone number is [phone] if you have questions or concerns     We would like to see you in follow up in 6 months or sooner if needed

## 2025-04-02 ENCOUNTER — APPOINTMENT (OUTPATIENT)
Dept: INTEGRATIVE MEDICINE | Facility: CLINIC | Age: 60
End: 2025-04-02
Payer: COMMERCIAL

## 2025-04-02 VITALS
BODY MASS INDEX: 38.65 KG/M2 | HEART RATE: 96 BPM | SYSTOLIC BLOOD PRESSURE: 92 MMHG | DIASTOLIC BLOOD PRESSURE: 61 MMHG | HEIGHT: 65 IN | WEIGHT: 232 LBS | OXYGEN SATURATION: 99 %

## 2025-04-02 DIAGNOSIS — E55.9 VITAMIN D DEFICIENCY: ICD-10-CM

## 2025-04-02 DIAGNOSIS — M54.16 LUMBAR RADICULITIS: ICD-10-CM

## 2025-04-02 DIAGNOSIS — F43.10 PTSD (POST-TRAUMATIC STRESS DISORDER): ICD-10-CM

## 2025-04-02 DIAGNOSIS — E53.8 VITAMIN B12 DEFICIENCY: Primary | ICD-10-CM

## 2025-04-02 DIAGNOSIS — M25.512 CHRONIC PAIN OF BOTH SHOULDERS: ICD-10-CM

## 2025-04-02 DIAGNOSIS — M25.511 CHRONIC PAIN OF BOTH SHOULDERS: ICD-10-CM

## 2025-04-02 DIAGNOSIS — G89.29 CHRONIC PAIN OF BOTH SHOULDERS: ICD-10-CM

## 2025-04-02 DIAGNOSIS — M99.05 SEGMENTAL AND SOMATIC DYSFUNCTION OF PELVIC REGION: ICD-10-CM

## 2025-04-02 ASSESSMENT — ENCOUNTER SYMPTOMS
ARTHRALGIAS: 1
BACK PAIN: 1
DIARRHEA: 1
CONSTIPATION: 0
SLEEP DISTURBANCE: 1
FATIGUE: 1

## 2025-04-02 NOTE — PATIENT INSTRUCTIONS
Suggest that you take vitmain d 5000 International Units by mouth daily.   Suggest you take vitamin B12 1000 mcg daily.   Take iron supplement every other night. It can be over the counter.   Spindrift  to try instead of gingerale   Strive to move more. Start to take a walk for 10- 15 minutes and work up to 30 minutes.   See physical therapy.   Could try out acupuncture.  I will make a referral.   Try six once a week sessions for your back pain of acupuncture..  Follow up in 3 months.   Marita Cunningham MD PhD
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
## 2025-04-02 NOTE — PROGRESS NOTES
Integrative Medicine Visit:     Subjective   Patient ID: Stephanie Gallardo is a 60 y.o. female who presents for Back Pain (SHOULDER PAIN AND NECK PAIN/)       Back Pain      Struggles with chronic lower back pain. Pain has been unbearable for almost a year. Used to not be every day. Wants to try anything but an injection. Fell two years ago and had to do shoulder surgery on left shoulder.     Found to have low vitamin d but was told by her new pcp that she only needs to take a multiple vitamin.        Lab Results   Component Value Date    VITD25 25 (L) 07/25/2024       Lab Results   Component Value Date    KGHULDRN93 >2,000 (H) 07/25/2024      Lab Results   Component Value Date    TSH 1.20 07/25/2024      Lab Results   Component Value Date    HGBA1C 5.1 07/25/2024      Lab Results   Component Value Date    GLUCOSE 92 07/25/2024    CALCIUM 8.5 (L) 07/25/2024     07/25/2024    K 3.5 07/25/2024    CO2 28 07/25/2024     07/25/2024    BUN 13 07/25/2024    CREATININE 0.69 07/25/2024      CONCERNS:  wants help with chronic back pain.     PMH:    Patient Active Problem List    Diagnosis Date Noted    Bilateral hip pain 10/28/2024    Shingles (herpes zoster) polyneuropathy 10/28/2024    Encounter for follow-up examination after completed treatment for conditions other than malignant neoplasm 10/28/2024    Chronic pain of both knees 08/12/2024    Pain in thoracic spine 08/12/2024    Generalized edema 08/12/2024    Chronic pain of both shoulders 08/12/2024    Asthma 09/27/2023    Chronic left hip pain 09/27/2023    Chronic neck pain 09/27/2023    Fibromyalgia 09/27/2023    Incontinence of urine 09/27/2023    Auditory hallucinations 07/21/2023    Vocal cord polyp 06/22/2023    Vitamin D deficiency 06/22/2023    Vitamin B12 deficiency 06/22/2023    Segmental and somatic dysfunction of pelvic region 06/22/2023    Sacroiliac joint dysfunction of both sides 06/22/2023    Keratoconjunctivitis sicca 06/22/2023     Pelvic pain in female 06/22/2023    Paresthesia of both hands 06/22/2023    Obstructive sleep apnea 06/22/2023    Allergic rhinitis due to animal hair and dander 06/22/2023    Anemia 06/22/2023    Anxiety 06/22/2023    Astigmatism of both eyes with presbyopia 06/22/2023    Chronic diarrhea 06/22/2023    Arthralgia of right hip 06/22/2023    Pelvic floor dysfunction 06/22/2023    Myofascial pain syndrome 06/22/2023    Moderate episode of recurrent major depressive disorder 06/22/2023    Lumbar radiculitis 06/22/2023    Irritable bowel syndrome 06/22/2023    Hypothyroidism 06/22/2023    Hot flashes, menopausal 06/22/2023    Herpes simplex virus (HSV) infection 06/22/2023    Hoarseness 06/22/2023    H/O bariatric surgery 06/22/2023    Fibroids 06/22/2023    Femoroacetabular impingement 06/22/2023    Esophageal reflux 06/22/2023    Dry eye syndrome of both lacrimal glands 06/22/2023    PTSD (post-traumatic stress disorder) 06/22/2023    Hypertrophy of breast 06/01/2018    Chronic bilateral low back pain 06/21/2017    Iron deficiency anemia secondary to inadequate dietary iron intake 02/27/2017    Displacement of lumbar intervertebral disc without myelopathy 02/16/2005    Hypertrophic and atrophic condition of skin 11/22/2004        [facility]:    Family History   Problem Relation Name Age of Onset    Other (cardiac disorder) Mother      Diabetes Mother      Hypertension Mother      Stroke Mother      Other (trauma) Father      Bone cancer Sister      Diabetes Sister      Epilepsy Sister      Hypertension Sister      Lung cancer Sister      Stroke Sister      Other (grand mal seizure) Sister      Breast cancer Sister      Autism Son      Other (allergic disorder) Child      Asthma Child      Eczema Child      Other (food allergy) Child      Other (sinus problem) Child      Cancer Mother's Sister      Cancer Maternal Cousin      Diabetes Other Grandmother         SOC:  lives with her mom who is 92 and she is retired. She is a  " on the side. Also older grown son lives with her as well. .     NUTRITION: granola bar and orange juice  Skipped lunch  Dinner: ground beef, Pashto rice and tossed salad water and gingerale  Lunch: fish with side salad of lettuce  Grapes  Snacks on corn chips. Usually has gingerale at dinner.     Smoking:  non-smoker    Alcohol use:   less than 1x per month     Exercise:   used to do pilates six days per week but cannot do this now due to shoulder pain. Walking does not bother her back. PT was helpful in the past.     SLEEP: sleeps well. Uses cpap. Does not get 8 hours but sleeps better when she travels.     STRESS MANAGEMENT: therapy.   SUPPORT: mother, son.     Review of Systems   Constitutional:  Positive for fatigue.   Gastrointestinal:  Positive for diarrhea. Negative for constipation.   Musculoskeletal:  Positive for arthralgias and back pain.   Psychiatric/Behavioral:  Positive for sleep disturbance. Negative for suicidal ideas.             Pain: chronic daily pain.     Objective   BP 92/61 (BP Location: Left arm, Patient Position: Sitting, BP Cuff Size: Adult)   Pulse 96   Ht 1.651 m (5' 5\")   Wt 105 kg (232 lb)   SpO2 99%   BMI 38.61 kg/m²       Physical Exam  HENT:      Head: Normocephalic and atraumatic.      Mouth/Throat:      Mouth: Mucous membranes are moist.   Cardiovascular:      Rate and Rhythm: Normal rate.   Pulmonary:      Effort: Pulmonary effort is normal. No respiratory distress.   Musculoskeletal:         General: No swelling or deformity.      Cervical back: Normal range of motion.      Comments: Pain located in mid lower back.    Skin:     General: Skin is dry.      Findings: No rash.   Neurological:      General: No focal deficit present.      Mental Status: She is alert and oriented to person, place, and time.      Motor: No weakness.      Gait: Gait normal.   Psychiatric:         Mood and Affect: Mood normal.         Thought Content: Thought content normal.      " Comments: Normal affect                      Assessment/Plan     Problem List Items Addressed This Visit             ICD-10-CM    Vitamin D deficiency E55.9    Relevant Orders    Vitamin D 25-Hydroxy,Total (for eval of Vitamin D levels)    Vitamin B12 deficiency - Primary E53.8    Relevant Orders    Vitamin B12    Segmental and somatic dysfunction of pelvic region M99.05    Lumbar radiculitis M54.16    Relevant Orders    Referral to Physical Therapy    PTSD (post-traumatic stress disorder) F43.10     Suggest exercise. Could her worsening back pain be related to the terrible trauma she sustained in the past. Discussed how helpful exercise can be. Continue therapy.          Chronic pain of both shoulders M25.511, G89.29, M25.512    Relevant Orders    Referral to Physical Therapy     Discussed lifestyle principles for pain management.   Work on only drinking water which may help with weight loss. Pt willing to give up gingerale.   Trial of acupuncture for pain.       Recommend Follow up in : 3 months.    Marita Cunningham MD PhD    Time Spent  Prep time on day of patient encounter: 3 minutes  Time spent directly with patient, family or caregiver: 50 minutes  Additional Time Spent on Patient Care Activities: 0 minutes  Documentation Time: 7 minutes  Other Time Spent: 0 minutes  Total: 60 minutes

## 2025-04-07 ENCOUNTER — APPOINTMENT (OUTPATIENT)
Dept: PHYSICAL MEDICINE AND REHAB | Facility: CLINIC | Age: 60
End: 2025-04-07
Payer: COMMERCIAL

## 2025-04-07 VITALS
SYSTOLIC BLOOD PRESSURE: 97 MMHG | HEIGHT: 65 IN | BODY MASS INDEX: 37.65 KG/M2 | WEIGHT: 226 LBS | DIASTOLIC BLOOD PRESSURE: 62 MMHG | HEART RATE: 90 BPM | OXYGEN SATURATION: 93 % | TEMPERATURE: 97.5 F

## 2025-04-07 DIAGNOSIS — M25.562 CHRONIC PAIN OF LEFT KNEE: ICD-10-CM

## 2025-04-07 DIAGNOSIS — M70.62 GREATER TROCHANTERIC BURSITIS OF BOTH HIPS: Primary | ICD-10-CM

## 2025-04-07 DIAGNOSIS — M25.551 BILATERAL HIP PAIN: ICD-10-CM

## 2025-04-07 DIAGNOSIS — M54.6 PAIN IN THORACIC SPINE: ICD-10-CM

## 2025-04-07 DIAGNOSIS — M25.552 BILATERAL HIP PAIN: ICD-10-CM

## 2025-04-07 DIAGNOSIS — M79.7 FIBROMYALGIA: ICD-10-CM

## 2025-04-07 DIAGNOSIS — M25.561 CHRONIC PAIN OF RIGHT KNEE: ICD-10-CM

## 2025-04-07 DIAGNOSIS — G89.29 CHRONIC PAIN OF RIGHT KNEE: ICD-10-CM

## 2025-04-07 DIAGNOSIS — G89.29 CHRONIC PAIN OF BOTH KNEES: ICD-10-CM

## 2025-04-07 DIAGNOSIS — M79.18 MYOFASCIAL PAIN SYNDROME: ICD-10-CM

## 2025-04-07 DIAGNOSIS — M54.50 CHRONIC BILATERAL LOW BACK PAIN WITHOUT SCIATICA: ICD-10-CM

## 2025-04-07 DIAGNOSIS — G89.29 CHRONIC PAIN OF BOTH SHOULDERS: ICD-10-CM

## 2025-04-07 DIAGNOSIS — G89.29 CHRONIC BILATERAL LOW BACK PAIN WITHOUT SCIATICA: ICD-10-CM

## 2025-04-07 DIAGNOSIS — M25.511 CHRONIC PAIN OF BOTH SHOULDERS: ICD-10-CM

## 2025-04-07 DIAGNOSIS — M54.2 CHRONIC NECK PAIN: ICD-10-CM

## 2025-04-07 DIAGNOSIS — R60.1 GENERALIZED EDEMA: ICD-10-CM

## 2025-04-07 DIAGNOSIS — M54.16 LUMBAR RADICULITIS: ICD-10-CM

## 2025-04-07 DIAGNOSIS — M25.562 CHRONIC PAIN OF BOTH KNEES: ICD-10-CM

## 2025-04-07 DIAGNOSIS — M25.561 CHRONIC PAIN OF BOTH KNEES: ICD-10-CM

## 2025-04-07 DIAGNOSIS — G89.29 CHRONIC NECK PAIN: ICD-10-CM

## 2025-04-07 DIAGNOSIS — M25.859: ICD-10-CM

## 2025-04-07 DIAGNOSIS — M25.512 CHRONIC PAIN OF BOTH SHOULDERS: ICD-10-CM

## 2025-04-07 DIAGNOSIS — M70.61 GREATER TROCHANTERIC BURSITIS OF BOTH HIPS: Primary | ICD-10-CM

## 2025-04-07 DIAGNOSIS — E55.9 VITAMIN D DEFICIENCY: ICD-10-CM

## 2025-04-07 DIAGNOSIS — G89.29 CHRONIC PAIN OF LEFT KNEE: ICD-10-CM

## 2025-04-07 DIAGNOSIS — M53.3 SACROILIAC JOINT DYSFUNCTION OF BOTH SIDES: ICD-10-CM

## 2025-04-07 PROCEDURE — 20551 NJX 1 TENDON ORIGIN/INSJ: CPT | Performed by: PHYSICAL MEDICINE & REHABILITATION

## 2025-04-07 PROCEDURE — 20553 NJX 1/MLT TRIGGER POINTS 3/>: CPT | Performed by: PHYSICAL MEDICINE & REHABILITATION

## 2025-04-07 PROCEDURE — 3008F BODY MASS INDEX DOCD: CPT | Performed by: PHYSICAL MEDICINE & REHABILITATION

## 2025-04-07 RX ORDER — TRIAMCINOLONE ACETONIDE 40 MG/ML
80 INJECTION, SUSPENSION INTRA-ARTICULAR; INTRAMUSCULAR ONCE
Status: COMPLETED | OUTPATIENT
Start: 2025-04-07 | End: 2025-04-07

## 2025-04-07 RX ADMIN — TRIAMCINOLONE ACETONIDE 80 MG: 40 INJECTION, SUSPENSION INTRA-ARTICULAR; INTRAMUSCULAR at 16:27

## 2025-04-07 ASSESSMENT — PAIN SCALES - GENERAL: PAINLEVEL_OUTOF10: 10-WORST PAIN EVER

## 2025-05-21 ENCOUNTER — APPOINTMENT (OUTPATIENT)
Dept: INTEGRATIVE MEDICINE | Facility: CLINIC | Age: 60
End: 2025-05-21
Payer: COMMERCIAL

## 2025-06-13 ENCOUNTER — TELEPHONE (OUTPATIENT)
Dept: OBSTETRICS AND GYNECOLOGY | Facility: CLINIC | Age: 60
End: 2025-06-13
Payer: COMMERCIAL

## 2025-06-13 DIAGNOSIS — E03.9 HYPOTHYROIDISM, UNSPECIFIED TYPE: ICD-10-CM

## 2025-06-13 DIAGNOSIS — B00.9 HERPES SIMPLEX VIRUS (HSV) INFECTION: ICD-10-CM

## 2025-06-13 DIAGNOSIS — B02.23 SHINGLES (HERPES ZOSTER) POLYNEUROPATHY: ICD-10-CM

## 2025-06-13 NOTE — TELEPHONE ENCOUNTER
Pt returned call.  Pt verified by name and .  Pt calling for rx of valacyclovir  and lidocaine.  Pt states her outbreak started a couple of days ago.  Pt is aware nurse will send message to Dr. Chadwick.  Pt has no questions at this milo.e

## 2025-06-16 RX ORDER — VALACYCLOVIR HYDROCHLORIDE 500 MG/1
500 TABLET, FILM COATED ORAL DAILY
Qty: 90 TABLET | Refills: 1 | Status: SHIPPED | OUTPATIENT
Start: 2025-06-16

## 2025-06-16 RX ORDER — LIDOCAINE 50 MG/G
OINTMENT TOPICAL AS NEEDED
Qty: 30 G | Refills: 0 | Status: SHIPPED | OUTPATIENT
Start: 2025-06-16 | End: 2026-06-16

## 2025-07-11 ENCOUNTER — PATIENT MESSAGE (OUTPATIENT)
Dept: ALLERGY | Facility: CLINIC | Age: 60
End: 2025-07-11
Payer: COMMERCIAL

## 2025-07-11 DIAGNOSIS — L29.9 PRURITUS: Primary | ICD-10-CM

## 2025-07-11 DIAGNOSIS — L20.9 ATOPIC DERMATITIS, UNSPECIFIED TYPE: ICD-10-CM

## 2025-07-11 RX ORDER — HYDROXYZINE HYDROCHLORIDE 25 MG/1
25 TABLET, FILM COATED ORAL NIGHTLY PRN
Qty: 14 TABLET | Refills: 0 | Status: SHIPPED | OUTPATIENT
Start: 2025-07-11

## 2025-07-14 ENCOUNTER — APPOINTMENT (OUTPATIENT)
Dept: OPHTHALMOLOGY | Age: 60
End: 2025-07-14
Payer: COMMERCIAL

## 2025-07-14 DIAGNOSIS — H11.823 CONJUNCTIVOCHALASIS OF BOTH EYES: ICD-10-CM

## 2025-07-14 DIAGNOSIS — H04.123 DRY EYE SYNDROME OF BOTH LACRIMAL GLANDS: Primary | ICD-10-CM

## 2025-07-14 DIAGNOSIS — H16.229 KERATOCONJUNCTIVITIS SICCA: ICD-10-CM

## 2025-07-14 PROCEDURE — 99213 OFFICE O/P EST LOW 20 MIN: CPT | Performed by: OPHTHALMOLOGY

## 2025-07-14 RX ORDER — NEOMYCIN SULFATE, POLYMYXIN B SULFATE, AND DEXAMETHASONE 3.5; 10000; 1 MG/G; [USP'U]/G; MG/G
OINTMENT OPHTHALMIC 2 TIMES DAILY
Qty: 3.5 G | Refills: 0 | Status: SHIPPED | OUTPATIENT
Start: 2025-07-14 | End: 2025-07-21

## 2025-07-14 RX ORDER — FLUOROMETHOLONE 1 MG/ML
SUSPENSION/ DROPS OPHTHALMIC
Qty: 5 ML | Refills: 0 | Status: SHIPPED | OUTPATIENT
Start: 2025-07-14 | End: 2025-07-28

## 2025-07-14 ASSESSMENT — VISUAL ACUITY
CORRECTION_TYPE: GLASSES
OS_CC: 20/20
METHOD: SNELLEN - LINEAR
OD_CC: 20/20
OS_CC+: -1

## 2025-07-14 ASSESSMENT — ENCOUNTER SYMPTOMS
CARDIOVASCULAR NEGATIVE: 0
PSYCHIATRIC NEGATIVE: 0
NEUROLOGICAL NEGATIVE: 0
EYES NEGATIVE: 1
RESPIRATORY NEGATIVE: 0
HEMATOLOGIC/LYMPHATIC NEGATIVE: 0
CONSTITUTIONAL NEGATIVE: 0
ALLERGIC/IMMUNOLOGIC NEGATIVE: 0
MUSCULOSKELETAL NEGATIVE: 0
GASTROINTESTINAL NEGATIVE: 0
ENDOCRINE NEGATIVE: 0

## 2025-07-14 ASSESSMENT — SLIT LAMP EXAM - LIDS: COMMENTS: 1+ MGD

## 2025-07-14 ASSESSMENT — TONOMETRY
IOP_METHOD: GOLDMANN APPLANATION
OS_IOP_MMHG: 18
OD_IOP_MMHG: 18

## 2025-07-14 ASSESSMENT — CUP TO DISC RATIO
OD_RATIO: .25
OS_RATIO: .25

## 2025-07-14 ASSESSMENT — EXTERNAL EXAM - LEFT EYE: OS_EXAM: NORMAL

## 2025-07-14 ASSESSMENT — EXTERNAL EXAM - RIGHT EYE: OD_EXAM: NORMAL

## 2025-07-14 NOTE — PROGRESS NOTES
Assessment/Plan   Diagnoses and all orders for this visit:  Dry eye syndrome of both lacrimal glands  -     fluorometholone (FML) 0.1 % ophthalmic suspension; Administer 1 drop into both eyes 4 times a day for 7 days, THEN 1 drop 2 times a day for 7 days.  -     neomycin-polymyxin B-dexameth (Polydex) 3.5 mg/g-10,000 unit/g-0.1 % ointment ophthalmic ointment; Apply to left eye 2 times a day for 7 days.  Keratoconjunctivitis sicca  No punctate epithelial erosions on exam today, Keep Restasis BID    Will do short course of FML for 2 weeks   Add fluorometholone (FML) 0.1 % ophthalmic suspension; Administer 1 drop into both eyes 4 times a day for 7 days, THEN 1 drop 2 times a day for 7 days.  -     neomycin-polymyxin B-dexameth (Polydex) 3.5 mg/g-10,000 unit/g-0.1 % ointment ophthalmic ointment; Apply to left eye 2 times a day for 7 days.  Conjunctivochalasis of both eyes  Might be contributing to her symptoms

## 2025-07-15 ENCOUNTER — APPOINTMENT (OUTPATIENT)
Dept: INTEGRATIVE MEDICINE | Facility: CLINIC | Age: 60
End: 2025-07-15
Payer: COMMERCIAL

## 2025-07-16 ENCOUNTER — APPOINTMENT (OUTPATIENT)
Dept: PHYSICAL MEDICINE AND REHAB | Facility: CLINIC | Age: 60
End: 2025-07-16
Payer: COMMERCIAL

## 2025-07-16 VITALS
BODY MASS INDEX: 39.82 KG/M2 | SYSTOLIC BLOOD PRESSURE: 133 MMHG | OXYGEN SATURATION: 96 % | DIASTOLIC BLOOD PRESSURE: 75 MMHG | HEIGHT: 65 IN | HEART RATE: 82 BPM | TEMPERATURE: 97.3 F | WEIGHT: 239 LBS

## 2025-07-16 DIAGNOSIS — M79.7 FIBROMYALGIA: ICD-10-CM

## 2025-07-16 DIAGNOSIS — M70.61 GREATER TROCHANTERIC BURSITIS OF BOTH HIPS: ICD-10-CM

## 2025-07-16 DIAGNOSIS — M25.562 CHRONIC PAIN OF BOTH KNEES: ICD-10-CM

## 2025-07-16 DIAGNOSIS — G89.29 CHRONIC PAIN OF BOTH SHOULDERS: ICD-10-CM

## 2025-07-16 DIAGNOSIS — M25.552 BILATERAL HIP PAIN: ICD-10-CM

## 2025-07-16 DIAGNOSIS — M25.512 CHRONIC PAIN OF BOTH SHOULDERS: ICD-10-CM

## 2025-07-16 DIAGNOSIS — R60.1 GENERALIZED EDEMA: ICD-10-CM

## 2025-07-16 DIAGNOSIS — M25.551 BILATERAL HIP PAIN: ICD-10-CM

## 2025-07-16 DIAGNOSIS — M25.859: ICD-10-CM

## 2025-07-16 DIAGNOSIS — Z09 ENCOUNTER FOR FOLLOW-UP EXAMINATION AFTER COMPLETED TREATMENT FOR CONDITIONS OTHER THAN MALIGNANT NEOPLASM: ICD-10-CM

## 2025-07-16 DIAGNOSIS — G89.29 CHRONIC PAIN OF BOTH KNEES: ICD-10-CM

## 2025-07-16 DIAGNOSIS — M54.50 CHRONIC BILATERAL LOW BACK PAIN WITHOUT SCIATICA: ICD-10-CM

## 2025-07-16 DIAGNOSIS — G89.29 CHRONIC BILATERAL LOW BACK PAIN WITHOUT SCIATICA: ICD-10-CM

## 2025-07-16 DIAGNOSIS — M70.62 GREATER TROCHANTERIC BURSITIS OF BOTH HIPS: ICD-10-CM

## 2025-07-16 DIAGNOSIS — M54.16 LUMBAR RADICULITIS: ICD-10-CM

## 2025-07-16 DIAGNOSIS — G89.29 CHRONIC PAIN OF LEFT KNEE: ICD-10-CM

## 2025-07-16 DIAGNOSIS — M53.3 SACROILIAC JOINT DYSFUNCTION OF BOTH SIDES: ICD-10-CM

## 2025-07-16 DIAGNOSIS — G89.29 CHRONIC PAIN OF RIGHT KNEE: ICD-10-CM

## 2025-07-16 DIAGNOSIS — G89.29 CHRONIC NECK PAIN: ICD-10-CM

## 2025-07-16 DIAGNOSIS — G47.33 OBSTRUCTIVE SLEEP APNEA: ICD-10-CM

## 2025-07-16 DIAGNOSIS — M79.18 MYOFASCIAL PAIN SYNDROME: Primary | ICD-10-CM

## 2025-07-16 DIAGNOSIS — M25.511 CHRONIC PAIN OF BOTH SHOULDERS: ICD-10-CM

## 2025-07-16 DIAGNOSIS — M25.562 CHRONIC PAIN OF LEFT KNEE: ICD-10-CM

## 2025-07-16 DIAGNOSIS — M25.561 CHRONIC PAIN OF RIGHT KNEE: ICD-10-CM

## 2025-07-16 DIAGNOSIS — M54.6 PAIN IN THORACIC SPINE: ICD-10-CM

## 2025-07-16 DIAGNOSIS — E55.9 VITAMIN D DEFICIENCY: ICD-10-CM

## 2025-07-16 DIAGNOSIS — M54.2 CHRONIC NECK PAIN: ICD-10-CM

## 2025-07-16 DIAGNOSIS — M25.561 CHRONIC PAIN OF BOTH KNEES: ICD-10-CM

## 2025-07-16 PROCEDURE — 20553 NJX 1/MLT TRIGGER POINTS 3/>: CPT | Performed by: PHYSICAL MEDICINE & REHABILITATION

## 2025-07-16 PROCEDURE — 3008F BODY MASS INDEX DOCD: CPT | Performed by: PHYSICAL MEDICINE & REHABILITATION

## 2025-07-16 ASSESSMENT — PAIN SCALES - GENERAL: PAINLEVEL_OUTOF10: 5
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
## 2025-07-16 NOTE — PATIENT INSTRUCTIONS
-Ice on and off for the next 24 hours if injection sites are sore. Do gentle range of motion exercises. Try to do them every hour for about half a minute or so, in every direction that the affected part goes. Avoid heavy lifting for the next 2 days.   -Continue Cymbalta to 60 mg  -Consider increasing gabapentin slowly in the future. Maximum is 1200 mg three times per day  -consider topamax, Lyrica in the future   -Continue Jhonatan chi, pilates, home exercises, PT.   -Continue with acupuncture and OMT as needed  -Follow up with Broderick Horton to discuss L5-S1 epidural steroid injections if you want  -bilateral knee MRIs ordered previously  -DEXA scan ordered previously  -Referral provided to Dr. Hoang to discuss PRP vs prolotherapy  -Follow up next available bilateral US guided subacromial steroid injections

## 2025-07-17 ENCOUNTER — APPOINTMENT (OUTPATIENT)
Dept: ALLERGY | Facility: CLINIC | Age: 60
End: 2025-07-17
Payer: COMMERCIAL

## 2025-07-24 ENCOUNTER — APPOINTMENT (OUTPATIENT)
Dept: ALLERGY | Facility: CLINIC | Age: 60
End: 2025-07-24
Payer: COMMERCIAL

## 2025-07-24 VITALS
TEMPERATURE: 98 F | DIASTOLIC BLOOD PRESSURE: 70 MMHG | OXYGEN SATURATION: 94 % | HEART RATE: 81 BPM | RESPIRATION RATE: 16 BRPM | SYSTOLIC BLOOD PRESSURE: 108 MMHG

## 2025-07-24 DIAGNOSIS — J30.81 ALLERGIC RHINITIS DUE TO ANIMAL HAIR AND DANDER: ICD-10-CM

## 2025-07-24 DIAGNOSIS — J30.1 SEASONAL ALLERGIC RHINITIS DUE TO POLLEN: ICD-10-CM

## 2025-07-24 DIAGNOSIS — R21 RASH: Primary | ICD-10-CM

## 2025-07-24 DIAGNOSIS — J30.89 ALLERGIC RHINITIS DUE TO DUST MITE: ICD-10-CM

## 2025-07-24 PROCEDURE — 99214 OFFICE O/P EST MOD 30 MIN: CPT | Performed by: ALLERGY & IMMUNOLOGY

## 2025-07-24 RX ORDER — TRIAMCINOLONE ACETONIDE 1 MG/G
CREAM TOPICAL 2 TIMES DAILY
Qty: 15 G | Refills: 3 | Status: SHIPPED | OUTPATIENT
Start: 2025-07-24

## 2025-07-24 ASSESSMENT — ENCOUNTER SYMPTOMS
ALLERGIC/IMMUNOLOGIC NEGATIVE: 1
HEMATOLOGIC/LYMPHATIC NEGATIVE: 1
RESPIRATORY NEGATIVE: 1
CONSTITUTIONAL NEGATIVE: 1
CARDIOVASCULAR NEGATIVE: 1
EYES NEGATIVE: 1
MUSCULOSKELETAL NEGATIVE: 1
GASTROINTESTINAL NEGATIVE: 1

## 2025-07-24 NOTE — PATIENT INSTRUCTIONS
It was nice to see you today     You may use triamcinolone cream on rash behind ears and base of neck    Use QNASL 2 puffs each nostril once daily    Use Astelin (Azelastine) 2 sprays each nostril in the evening particularly during higher pollen count days.     Hold pataday (olopatadine 0.2% eyedrops) for now     You can use your Xyzal 5 mg once to twice daily     Try nasal saline sinus rinses- recommend Ananda Med nasal saline rinses.  Must use distilled water for this.    Please have labs drawn. Please note that some labs will come back sooner than others.  We will contact you when all labs have returned so that we can discuss results.     You can try a HEPA air purifier to improve air quality in the house     Our nurse line phone number is [phone] if you have questions or concerns     We would like to see you in follow up in spring 2026 sooner if needed

## 2025-07-24 NOTE — PROGRESS NOTES
Stephanie Gallardo presents for follow up evaluation today.  She reports that since last visit, she has noticed itching of the back of her neck and ears after getting her hair braided.  She also also noticed increase in allergy symptoms over the past several weeks.  She is following with ophthalmology for chronic dry eye.  She has been using Qnasl 2 puffs once daily and levocetirizine 5 mg once daily as needed.  She uses Astelin when her nasal symptoms are bothersome.        Review of Systems   Constitutional: Negative.    HENT: Negative.     Eyes: Negative.    Respiratory: Negative.     Cardiovascular: Negative.    Gastrointestinal: Negative.    Musculoskeletal: Negative.    Skin:  Positive for rash.   Allergic/Immunologic: Negative.    Hematological: Negative.      Vital signs:  /70   Pulse 81   Temp 36.7 °C (98 °F)   Resp 16   SpO2 94% Comment: Patient has artifical nails      GENERAL: Alert, oriented and in no acute distress.     HEENT: EYES: No conjunctival injection or cobblestoning. Nose: nasal turbinates are not edematous and are not boggy.  There is no mucous stranding, polyps, or blood noted. EARS: Tympanic membranes are clear. MOUTH: moist and pink with no exudates, ulcers, or thrush. NECK: No upper airway stridor noted.       HEART: regular rate and rhythm.       LUNGS: Clear to auscultation bilaterally. No wheezing, rhonchi or rales.        ABDOMEN: Positive bowel sounds, soft, nontender, nondistended.       EXTREMITIES: No clubbing or edema.        NEURO:  Normal affect.  Gait normal.      SKIN: Mild redness and flaking behind both ears    Impression:   1. Rash    2. Seasonal allergic rhinitis due to pollen    3. Allergic rhinitis due to animal hair and dander    4. Allergic rhinitis due to dust mite        Assessment and plan:   Rash: New onset, likely contact dermatitis. Started after using braiding hair.  Recommend triamcinolone 0.1% cream to affected areas.    Allergic rhinitis,  seasonal and perennial: Stephanie has a history of allergic rhinitis with sensitivity to tree pollen, grass pollen, weed pollen, dust mite, cat, dog, and mold on prior aeroallergen skin prick testing with Dr. Roberson (2018). Continue QNASL 2 puffs each nostril daily, may use Astelin 2 sprays each nostril up to twice daily, and levocetirizine 5 mg daily.  We discussed updating environmental allergy testing today given worsening of symptoms.  Environmental allergen specific IgE panel ordered.  Will make further recommendations pending results of testing.    Allergic conjunctivitis, bilateral; chronic dry eye: Recommend holding olopatadine eyedrops given chronic dry eye.  She is on Restasis and steroid eye drops prescribed by ophthalmology.     Vocal cord polyps: Has been evaluated by  ENT, stable      Plan for follow-up in spring 2026 or sooner if needed

## 2025-07-26 LAB
A ALTERNATA IGE QN: <0.1 KU/L
A ALTERNATA IGE RAST: 0
A FUMIGATUS IGE QN: <0.1 KU/L
A FUMIGATUS IGE RAST: 0
BERMUDA GRASS IGE QN: 0.11 KU/L
BERMUDA GRASS IGE RAST: ABNORMAL
BOXELDER IGE QN: <0.1 KU/L
BOXELDER IGE RAST: 0
C HERBARUM IGE QN: <0.1 KU/L
C HERBARUM IGE RAST: 0
CALIF WALNUT POLN IGE QN: <0.1 KU/L
CALIF WALNUT POLN IGE RAST: 0
CAT (RFEL D) 1 IGE QN: ABNORMAL
CAT (RFEL D) 4 IGE QN: ABNORMAL
CAT (RFEL D) 7 IGE QN: ABNORMAL
CAT DANDER IGE QN: 0.26 KU/L
CAT DANDER IGE RAST: ABNORMAL
CMN PIGWEED IGE QN: <0.1 KU/L
CMN PIGWEED IGE RAST: 0
COMMON RAGWEED IGE QN: 0.16 KU/L
COMMON RAGWEED IGE RAST: ABNORMAL
COTTONWOOD IGE QN: <0.1 KU/L
COTTONWOOD IGE RAST: 0
D FARINAE IGE QN: <0.1 KU/L
D FARINAE IGE RAST: 0
D PTERONYSS IGE QN: <0.1 KU/L
D PTERONYSS IGE RAST: 0
DOG DANDER IGE QN: <0.1 KU/L
DOG DANDER IGE RAST: 0
IGE SERPL-ACNC: 13 KU/L
LONDON PLANE IGE QN: <0.1 KU/L
LONDON PLANE IGE RAST: 0
MOUSE URINE PROT IGE QN: <0.1 KU/L
MOUSE URINE PROT IGE RAST: 0
MT JUNIPER IGE QN: <0.1 KU/L
MT JUNIPER IGE RAST: 0
P NOTATUM IGE QN: <0.1 KU/L
P NOTATUM IGE RAST: 0
PECAN/HICK TREE IGE QN: <0.1 KU/L
PECAN/HICK TREE IGE RAST: 0
QUEST CAT DANDER COMP PNL FEL D 2 (E220) IGE: ABNORMAL
REF LAB TEST REF RANGE: NORMAL
ROACH IGE QN: <0.1 KU/L
ROACH IGE RAST: 0
SALTWORT IGE QN: <0.1 KU/L
SALTWORT IGE RAST: 0
SHEEP SORREL IGE QN: <0.1 KU/L
SHEEP SORREL IGE RAST: 0
SILVER BIRCH IGE QN: <0.1 KU/L
SILVER BIRCH IGE RAST: 0
TIMOTHY IGE QN: 0.46 KU/L
TIMOTHY IGE RAST: 1
WHITE ASH IGE QN: <0.1 KU/L
WHITE ASH IGE RAST: 0
WHITE ELM IGE QN: <0.1 KU/L
WHITE ELM IGE RAST: 0
WHITE MULBERRY IGE QN: <0.1 KU/L
WHITE MULBERRY IGE RAST: 0
WHITE OAK IGE QN: <0.1 KU/L
WHITE OAK IGE RAST: 0

## 2025-07-28 ENCOUNTER — RESULTS FOLLOW-UP (OUTPATIENT)
Dept: ALLERGY | Facility: CLINIC | Age: 60
End: 2025-07-28
Payer: COMMERCIAL

## 2025-07-28 LAB
A ALTERNATA IGE QN: <0.1 KU/L
A ALTERNATA IGE RAST: 0
A FUMIGATUS IGE QN: <0.1 KU/L
A FUMIGATUS IGE RAST: 0
BERMUDA GRASS IGE QN: 0.11 KU/L
BERMUDA GRASS IGE RAST: ABNORMAL
BOXELDER IGE QN: <0.1 KU/L
BOXELDER IGE RAST: 0
C HERBARUM IGE QN: <0.1 KU/L
C HERBARUM IGE RAST: 0
CALIF WALNUT POLN IGE QN: <0.1 KU/L
CALIF WALNUT POLN IGE RAST: 0
CAT (RFEL D) 1 IGE QN: <0.1 KU/L
CAT (RFEL D) 4 IGE QN: <0.1 KU/L
CAT (RFEL D) 7 IGE QN: <0.1 KU/L
CAT DANDER IGE QN: 0.26 KU/L
CAT DANDER IGE RAST: ABNORMAL
CMN PIGWEED IGE QN: <0.1 KU/L
CMN PIGWEED IGE RAST: 0
COMMON RAGWEED IGE QN: 0.16 KU/L
COMMON RAGWEED IGE RAST: ABNORMAL
COTTONWOOD IGE QN: <0.1 KU/L
COTTONWOOD IGE RAST: 0
D FARINAE IGE QN: <0.1 KU/L
D FARINAE IGE RAST: 0
D PTERONYSS IGE QN: <0.1 KU/L
D PTERONYSS IGE RAST: 0
DOG DANDER IGE QN: <0.1 KU/L
DOG DANDER IGE RAST: 0
IGE SERPL-ACNC: 13 KU/L
LONDON PLANE IGE QN: <0.1 KU/L
LONDON PLANE IGE RAST: 0
MOUSE URINE PROT IGE QN: <0.1 KU/L
MOUSE URINE PROT IGE RAST: 0
MT JUNIPER IGE QN: <0.1 KU/L
MT JUNIPER IGE RAST: 0
P NOTATUM IGE QN: <0.1 KU/L
P NOTATUM IGE RAST: 0
PECAN/HICK TREE IGE QN: <0.1 KU/L
PECAN/HICK TREE IGE RAST: 0
QUEST CAT DANDER COMP PNL FEL D 2 (E220) IGE: <0.1 KU/L
REF LAB TEST REF RANGE: NORMAL
ROACH IGE QN: <0.1 KU/L
ROACH IGE RAST: 0
SALTWORT IGE QN: <0.1 KU/L
SALTWORT IGE RAST: 0
SHEEP SORREL IGE QN: <0.1 KU/L
SHEEP SORREL IGE RAST: 0
SILVER BIRCH IGE QN: <0.1 KU/L
SILVER BIRCH IGE RAST: 0
TIMOTHY IGE QN: 0.46 KU/L
TIMOTHY IGE RAST: 1
WHITE ASH IGE QN: <0.1 KU/L
WHITE ASH IGE RAST: 0
WHITE ELM IGE QN: <0.1 KU/L
WHITE ELM IGE RAST: 0
WHITE MULBERRY IGE QN: <0.1 KU/L
WHITE MULBERRY IGE RAST: 0
WHITE OAK IGE QN: <0.1 KU/L
WHITE OAK IGE RAST: 0

## 2025-07-28 NOTE — TELEPHONE ENCOUNTER
Environmental allergy testing positive to cat, grass, ragweed.  Continue current recommendations and consider HEPA air purifier at home.

## 2025-08-20 ENCOUNTER — APPOINTMENT (OUTPATIENT)
Dept: OPHTHALMOLOGY | Facility: CLINIC | Age: 60
End: 2025-08-20
Payer: COMMERCIAL

## 2025-08-25 ENCOUNTER — APPOINTMENT (OUTPATIENT)
Dept: OPHTHALMOLOGY | Age: 60
End: 2025-08-25
Payer: COMMERCIAL

## 2025-09-10 ENCOUNTER — APPOINTMENT (OUTPATIENT)
Dept: INTEGRATIVE MEDICINE | Facility: CLINIC | Age: 60
End: 2025-09-10
Payer: COMMERCIAL

## 2025-09-11 ENCOUNTER — APPOINTMENT (OUTPATIENT)
Dept: ALLERGY | Facility: CLINIC | Age: 60
End: 2025-09-11
Payer: COMMERCIAL

## 2025-09-17 ENCOUNTER — APPOINTMENT (OUTPATIENT)
Dept: PHYSICAL MEDICINE AND REHAB | Facility: CLINIC | Age: 60
End: 2025-09-17
Payer: COMMERCIAL

## 2025-10-20 ENCOUNTER — APPOINTMENT (OUTPATIENT)
Dept: PHYSICAL MEDICINE AND REHAB | Facility: CLINIC | Age: 60
End: 2025-10-20
Payer: COMMERCIAL